# Patient Record
Sex: FEMALE | Race: WHITE | NOT HISPANIC OR LATINO | Employment: PART TIME | URBAN - METROPOLITAN AREA
[De-identification: names, ages, dates, MRNs, and addresses within clinical notes are randomized per-mention and may not be internally consistent; named-entity substitution may affect disease eponyms.]

---

## 2018-07-23 ENCOUNTER — TELEPHONE (OUTPATIENT)
Dept: OBSTETRICS AND GYNECOLOGY | Age: 43
End: 2018-07-23

## 2018-07-23 NOTE — TELEPHONE ENCOUNTER
Just discovered pt has not been seen in this office yet, has new pt appt 9-21-18.Scheduled 7-25-18 at 11:00 as new pt and pelvic US

## 2018-07-23 NOTE — TELEPHONE ENCOUNTER
Dr Collier pt who is aware is on call, had low back pain and lower Left quad pain plus nausea for 7 days, pt believed it to be kidney stones and had CT. Reviewed results today with urologist who states she had no kidney stones but it did show lesions on both ovaries david the Left ovary and to fu with gyn. Would you like to set up next avail US?

## 2018-07-25 ENCOUNTER — OFFICE VISIT (OUTPATIENT)
Dept: OBSTETRICS AND GYNECOLOGY | Age: 43
End: 2018-07-25

## 2018-07-25 ENCOUNTER — PROCEDURE VISIT (OUTPATIENT)
Dept: OBSTETRICS AND GYNECOLOGY | Age: 43
End: 2018-07-25

## 2018-07-25 VITALS
DIASTOLIC BLOOD PRESSURE: 82 MMHG | WEIGHT: 209.2 LBS | BODY MASS INDEX: 37.07 KG/M2 | SYSTOLIC BLOOD PRESSURE: 118 MMHG | HEIGHT: 63 IN

## 2018-07-25 DIAGNOSIS — N83.202 CYSTS OF BOTH OVARIES: Primary | ICD-10-CM

## 2018-07-25 DIAGNOSIS — N83.201 CYSTS OF BOTH OVARIES: Primary | ICD-10-CM

## 2018-07-25 DIAGNOSIS — N83.202 BILATERAL OVARIAN CYSTS: ICD-10-CM

## 2018-07-25 DIAGNOSIS — N92.1 MENORRHAGIA WITH IRREGULAR CYCLE: Primary | ICD-10-CM

## 2018-07-25 DIAGNOSIS — N92.3 INTERMENSTRUAL BLEEDING: ICD-10-CM

## 2018-07-25 DIAGNOSIS — N83.201 BILATERAL OVARIAN CYSTS: ICD-10-CM

## 2018-07-25 DIAGNOSIS — R10.2 PELVIC PAIN: ICD-10-CM

## 2018-07-25 LAB
B-HCG UR QL: NEGATIVE
INTERNAL NEGATIVE CONTROL: NEGATIVE
INTERNAL POSITIVE CONTROL: POSITIVE
Lab: NORMAL

## 2018-07-25 PROCEDURE — 76830 TRANSVAGINAL US NON-OB: CPT | Performed by: OBSTETRICS & GYNECOLOGY

## 2018-07-25 PROCEDURE — 58100 BIOPSY OF UTERUS LINING: CPT | Performed by: OBSTETRICS & GYNECOLOGY

## 2018-07-25 PROCEDURE — 99203 OFFICE O/P NEW LOW 30 MIN: CPT | Performed by: OBSTETRICS & GYNECOLOGY

## 2018-07-25 PROCEDURE — 81025 URINE PREGNANCY TEST: CPT | Performed by: OBSTETRICS & GYNECOLOGY

## 2018-07-25 RX ORDER — IBUPROFEN 800 MG/1
TABLET ORAL
COMMUNITY
Start: 2018-07-18 | End: 2018-11-26

## 2018-07-25 NOTE — PROGRESS NOTES
"Subjective     Chief Complaint   Patient presents with   • Follow-up     gyn us/ ovaries        Mary Cole is a 43 y.o. No obstetric history on file. whose LMP is Patient's last menstrual period was 06/27/2018 (exact date). presents for New Gyn visit (re-establish care). Last seen 9/2012. I delivered her fourth child. C/o 4 mo h/o lower abdominal pain that became worse last week. She had a CT scan done as she thought it could be a kidney stone, but it showed bilateral enlarged cystic ovaries. Her menses are q 4-6 mo and last 3-6 days. +menorrhagia and clots. She has some intermenstrual bleeding. Contraception-  vasectomy. She also c/o some throat tightening and some nausea. No family h/o gyn cancers. She has not had a MMG and her last pap was 2012.      No Additional Complaints Reported    The following portions of the patient's history were reviewed and updated as appropriate:vital signs, allergies, current medications, past family history, past medical history, past social history, past surgical history and problem list      Review of Systems   Constitutional: positive for fatigue  Eyes: negative  Ears, nose, mouth, throat, and face: throat tightening  Respiratory: negative  Cardiovascular: negative  Gastrointestinal: positive for abdominal pain and nausea  Genitourinary:positive for abnormal menstrual periods  Integument/breast: negative  Hematologic/lymphatic: negative  Musculoskeletal:negative  Neurological: negative  Behavioral/Psych: negative  Endocrine: negative     Objective      /82   Ht 160 cm (63\")   Wt 94.9 kg (209 lb 3.2 oz)   LMP 06/27/2018 (Exact Date)   BMI 37.06 kg/m²     Physical Exam    General:   alert, appears stated age and no distress   Heart: Not performed today   Lungs: Not performed today.   Breast: Not performed today   Neck: thyroid not enlarged, symmetric, no tenderness/mass/nodules   Abdomen: {soft, non-tender. Bowel sounds normal. No masses,  no organomegaly "   CVA: Not performed today   Pelvis: External genitalia: normal general appearance  Urinary system: urethral meatus normal  Vaginal: normal mucosa without prolapse or lesions  Cervix: normal appearance  Adnexa: tenderness, enlarged adnexa, left and right and mass, left and right (about 7 cm, very firm, mobile, +tenderness)  Uterus: normal single, nontender, anteverted and tender   Extremities: Extremities normal, atraumatic, no cyanosis or edema   Neurologic: negative   Psychiatric: Normal affect, judgement, and mood       Lab Review   Labs: UPT negative today    Imaging   Ultrasound - Pelvic Vaginal  Uterus 9.6 x 6 x 5.4 cm, EML=1.16 cm, left ovary 8 x 5 x 4.6 cm w/ multiple cysts, right ovary 7.2 x 5 x 5 cm with multiple cysts, no free fluid    Assessment/Plan     ASSESSMENT  1. Menorrhagia with irregular cycle    2. Intermenstrual bleeding    3. Bilateral ovarian cysts        PLAN  1.   Orders Placed This Encounter   Procedures   • TSH   •    • Comprehensive Metabolic Panel   • CBC & Differential       2. Medications prescribed this encounter:      No orders of the defined types were placed in this encounter.      3. Procedure- verbal consent obtained for endometrial biopsy. Betadine prep of cervix performed. Endometrial biopsy performed w/ pipelle w/ one pass. Tissue to pathology. Pt tolerated well. Recommend OTC ibuprofen for cramping.  4. Very concerned by this clinical picture with symptoms for 4 mo and hard masses felt on US. Will refer to gyn/oncology for evaluation. May require removal of ovaries. May consider ovarian suppression w/ low dose ocp if endometrial biopsy negative. Samples given of Lo loestrin.   Follow up: 1 week for annual (pap) and mammogram. Pt fearful of Mammogram but agrees to have it done, will given percocet 5/325 mg x2 tablets for pre-treatment    Mago Colunga MD  7/25/2018

## 2018-07-26 LAB
ALBUMIN SERPL-MCNC: 4.8 G/DL (ref 3.5–5.2)
ALBUMIN/GLOB SERPL: 1.9 G/DL
ALP SERPL-CCNC: 71 U/L (ref 39–117)
ALT SERPL-CCNC: 24 U/L (ref 1–33)
AST SERPL-CCNC: 17 U/L (ref 1–32)
BASOPHILS # BLD AUTO: 0.05 10*3/MM3 (ref 0–0.2)
BASOPHILS NFR BLD AUTO: 0.8 % (ref 0–1.5)
BILIRUB SERPL-MCNC: 0.2 MG/DL (ref 0.1–1.2)
BUN SERPL-MCNC: 9 MG/DL (ref 6–20)
BUN/CREAT SERPL: 9.6 (ref 7–25)
CALCIUM SERPL-MCNC: 9.2 MG/DL (ref 8.6–10.5)
CANCER AG125 SERPL-ACNC: 33.7 U/ML (ref 0–38.1)
CHLORIDE SERPL-SCNC: 104 MMOL/L (ref 98–107)
CO2 SERPL-SCNC: 22 MMOL/L (ref 22–29)
CREAT SERPL-MCNC: 0.94 MG/DL (ref 0.57–1)
DIFFERENTIAL COMMENT: NORMAL
EOSINOPHIL # BLD AUTO: 0.2 10*3/MM3 (ref 0–0.7)
EOSINOPHIL NFR BLD AUTO: 3.1 % (ref 0.3–6.2)
ERYTHROCYTE [DISTWIDTH] IN BLOOD BY AUTOMATED COUNT: 17.8 % (ref 11.7–13)
GLOBULIN SER CALC-MCNC: 2.5 GM/DL
GLUCOSE SERPL-MCNC: 96 MG/DL (ref 65–99)
HCT VFR BLD AUTO: 30.4 % (ref 35.6–45.5)
HGB BLD-MCNC: 8.8 G/DL (ref 11.9–15.5)
IMM GRANULOCYTES # BLD: 0.01 10*3/MM3 (ref 0–0.03)
IMM GRANULOCYTES NFR BLD: 0.2 % (ref 0–0.5)
LYMPHOCYTES # BLD AUTO: 2.12 10*3/MM3 (ref 0.9–4.8)
LYMPHOCYTES NFR BLD AUTO: 33 % (ref 19.6–45.3)
MCH RBC QN AUTO: 21.2 PG (ref 26.9–32)
MCHC RBC AUTO-ENTMCNC: 28.9 G/DL (ref 32.4–36.3)
MCV RBC AUTO: 73.3 FL (ref 80.5–98.2)
MONOCYTES # BLD AUTO: 0.45 10*3/MM3 (ref 0.2–1.2)
MONOCYTES NFR BLD AUTO: 7 % (ref 5–12)
NEUTROPHILS # BLD AUTO: 3.6 10*3/MM3 (ref 1.9–8.1)
NEUTROPHILS NFR BLD AUTO: 56.1 % (ref 42.7–76)
PLATELET # BLD AUTO: 313 10*3/MM3 (ref 140–500)
PLATELET BLD QL SMEAR: NORMAL
POTASSIUM SERPL-SCNC: 4.4 MMOL/L (ref 3.5–5.2)
PROT SERPL-MCNC: 7.3 G/DL (ref 6–8.5)
RBC # BLD AUTO: 4.15 10*6/MM3 (ref 3.9–5.2)
RBC MORPH BLD: NORMAL
SODIUM SERPL-SCNC: 140 MMOL/L (ref 136–145)
TSH SERPL DL<=0.005 MIU/L-ACNC: 2.64 MIU/ML (ref 0.27–4.2)
WBC # BLD AUTO: 6.42 10*3/MM3 (ref 4.5–10.7)

## 2018-07-30 LAB
DX ICD CODE: NORMAL
DX ICD CODE: NORMAL
PATH REPORT.FINAL DX SPEC: NORMAL
PATH REPORT.GROSS SPEC: NORMAL
PATH REPORT.RELEVANT HX SPEC: NORMAL
PATH REPORT.SITE OF ORIGIN SPEC: NORMAL
PATHOLOGIST NAME: NORMAL
PAYMENT PROCEDURE: NORMAL

## 2018-08-01 ENCOUNTER — OFFICE VISIT (OUTPATIENT)
Dept: OBSTETRICS AND GYNECOLOGY | Age: 43
End: 2018-08-01

## 2018-08-01 ENCOUNTER — APPOINTMENT (OUTPATIENT)
Dept: WOMENS IMAGING | Facility: HOSPITAL | Age: 43
End: 2018-08-01

## 2018-08-01 VITALS
SYSTOLIC BLOOD PRESSURE: 110 MMHG | HEIGHT: 63 IN | BODY MASS INDEX: 36.68 KG/M2 | WEIGHT: 207 LBS | DIASTOLIC BLOOD PRESSURE: 74 MMHG

## 2018-08-01 DIAGNOSIS — N92.1 MENORRHAGIA WITH IRREGULAR CYCLE: ICD-10-CM

## 2018-08-01 DIAGNOSIS — Z01.419 WELL WOMAN EXAM WITH ROUTINE GYNECOLOGICAL EXAM: Primary | ICD-10-CM

## 2018-08-01 DIAGNOSIS — R09.89 THROAT TIGHTNESS: ICD-10-CM

## 2018-08-01 DIAGNOSIS — D64.9 ANEMIA, UNSPECIFIED TYPE: ICD-10-CM

## 2018-08-01 DIAGNOSIS — Z11.51 ENCOUNTER FOR SCREENING FOR HUMAN PAPILLOMAVIRUS (HPV): ICD-10-CM

## 2018-08-01 PROCEDURE — 99396 PREV VISIT EST AGE 40-64: CPT | Performed by: OBSTETRICS & GYNECOLOGY

## 2018-08-01 PROCEDURE — 77067 SCR MAMMO BI INCL CAD: CPT | Performed by: RADIOLOGY

## 2018-08-01 RX ORDER — IBUPROFEN 800 MG/1
400 TABLET ORAL
COMMUNITY
Start: 2018-07-18 | End: 2018-11-26

## 2018-08-01 NOTE — PROGRESS NOTES
Chief complaint: annual    Subjective   History of Present Illness    Mary Cole is a 43 y.o.  who presents for annual exam. She was recently seen here for a problem visit as a re-establish care, New Gyn. Had c/o lower abdominal pain and CT scan showed bilateral enlarged cystic ovaries. US last week here noted bilateral enlarged cystic ovaries, one ovary was 8 cm in diameter and the other was 7 cm in diameter. On exam the ovaries felt very hard. She was sent to Gyn/Oncology for a consult and was offered definitive surgical evaluation w/ hysterectomy and BSO versus observation. She would like to observe at this time. A GI consult was made and is scheduled. Pt continues to c/o nausea, fatigue and throat tightness. She was started on Lo loestrin for menorrhagia w/ irregular menses and anemia (HGB=8.8) and for ovarian suppression.   Her menses are q 4-6 mo and very heavy.   EMB-benign, TSH normal, HGB=8.8     Obstetric History:  OB History      Para Term  AB Living    4 4 4     4    SAB TAB Ectopic Molar Multiple Live Births              4         Menstrual History:     Patient's last menstrual period was 2018 (exact date).         Current contraception: vasectomy  History of abnormal Pap smear: no  Received Gardasil immunization: no  Perform regular self breast exam: yes -    Family history of uterine or ovarian cancer: no  Family History of colon cancer: no  Family history of breast cancer: no    Mammogram: done today.  Colonoscopy: recommended.  DEXA: not indicated.    Exercise: moderately active  Calcium/Vitamin D: adequate intake    The following portions of the patient's history were reviewed and updated as appropriate: allergies, current medications, past family history, past medical history, past social history, past surgical history and problem list.    Review of Systems   Constitutional: Positive for fatigue.   HENT: Positive for trouble swallowing.         Throat tightness  "  Gastrointestinal: Positive for nausea.   Genitourinary: Positive for menstrual problem and pelvic pain.       Pertinent items are noted in HPI.     Objective   Physical Exam    /74   Ht 160 cm (63\")   Wt 93.9 kg (207 lb)   LMP 07/25/2018 (Exact Date)   Breastfeeding? No   BMI 36.67 kg/m²     General:   alert, appears stated age and cooperative   Neck: no asymmetry, masses, or scars   Heart: regular rate and rhythm, S1, S2 normal, no murmur, click, rub or gallop   Lungs: clear to auscultation bilaterally   Abdomen: soft, non-tender, without masses or organomegaly   Breast: inspection negative, no nipple discharge or bleeding, no masses or nodularity palpable   Vulva: normal, Bartholin's, Urethra, Pearson's normal   Vagina: normal mucosa   Cervix: multiparous appearance, no bleeding following Pap and no cervical motion tenderness   Uterus: normal size, anteverted, mobile, non-tender, normal shape and consistency   Adnexa: bilateral enlarged adnexal masses, very hard/firm, mobile   Rectal: not indicated     Assessment/Plan   Mary was seen today for gynecologic exam.    Diagnoses and all orders for this visit:    Well woman exam with routine gynecological exam  -     PapIG, HPV, Rfx 16 / 18    Encounter for screening for human papillomavirus (HPV)  -     PapIG, HPV, Rfx 16 / 18    Throat tightness  -     Ambulatory Referral to ENT (Otolaryngology)    Anemia, unspecified type    Menorrhagia with irregular cycle  -     Norethin-Eth Estrad-Fe Biphas (LO LOESTRIN FE) 1 MG-10 MCG / 10 MCG tablet; Take 1 tablet by mouth Daily.    Pt asked my opinion regarding surgery now versus observation, due to the size of the ovaries and how hard they felt, I would recommend surgical treatment.    Breast self exam technique reviewed and patient encouraged to perform self-exam monthly.  Discussed healthy lifestyle modifications.  Pap smear sent    Pt taking prenatal vitamin and additional iron for anemia.             "

## 2018-08-07 LAB
CYTOLOGIST CVX/VAG CYTO: ABNORMAL
CYTOLOGY CVX/VAG DOC THIN PREP: ABNORMAL
DX ICD CODE: ABNORMAL
DX ICD CODE: ABNORMAL
HIV 1 & 2 AB SER-IMP: ABNORMAL
HPV I/H RISK 1 DNA CVX QL PROBE+SIG AMP: NEGATIVE
Lab: ABNORMAL
OTHER STN SPEC: ABNORMAL
PATH REPORT.FINAL DX SPEC: ABNORMAL
PATHOLOGIST CVX/VAG CYTO: ABNORMAL
RECOM F/U CVX/VAG CYTO: ABNORMAL
STAT OF ADQ CVX/VAG CYTO-IMP: ABNORMAL

## 2018-11-26 ENCOUNTER — OFFICE VISIT (OUTPATIENT)
Dept: OBSTETRICS AND GYNECOLOGY | Age: 43
End: 2018-11-26

## 2018-11-26 VITALS
HEIGHT: 63 IN | DIASTOLIC BLOOD PRESSURE: 72 MMHG | WEIGHT: 210.6 LBS | SYSTOLIC BLOOD PRESSURE: 126 MMHG | BODY MASS INDEX: 37.32 KG/M2

## 2018-11-26 DIAGNOSIS — E89.41 HOT FLASHES DUE TO SURGICAL MENOPAUSE: ICD-10-CM

## 2018-11-26 DIAGNOSIS — G89.18 POST-OP PAIN: Primary | ICD-10-CM

## 2018-11-26 PROCEDURE — 99213 OFFICE O/P EST LOW 20 MIN: CPT | Performed by: OBSTETRICS & GYNECOLOGY

## 2019-01-22 ENCOUNTER — TELEPHONE (OUTPATIENT)
Dept: OBSTETRICS AND GYNECOLOGY | Age: 44
End: 2019-01-22

## 2019-09-25 ENCOUNTER — OFFICE VISIT (OUTPATIENT)
Dept: OBSTETRICS AND GYNECOLOGY | Age: 44
End: 2019-09-25

## 2019-09-25 VITALS
HEIGHT: 63 IN | BODY MASS INDEX: 37.92 KG/M2 | DIASTOLIC BLOOD PRESSURE: 72 MMHG | WEIGHT: 214 LBS | SYSTOLIC BLOOD PRESSURE: 114 MMHG

## 2019-09-25 DIAGNOSIS — R20.2 FACIAL PARESTHESIA: ICD-10-CM

## 2019-09-25 DIAGNOSIS — R10.11 RUQ PAIN: ICD-10-CM

## 2019-09-25 DIAGNOSIS — R51.9 NEW ONSET OF HEADACHES: ICD-10-CM

## 2019-09-25 DIAGNOSIS — R53.83 FATIGUE, UNSPECIFIED TYPE: ICD-10-CM

## 2019-09-25 DIAGNOSIS — E89.41 HOT FLASHES DUE TO SURGICAL MENOPAUSE: ICD-10-CM

## 2019-09-25 DIAGNOSIS — R10.2 PELVIC PAIN IN FEMALE: ICD-10-CM

## 2019-09-25 DIAGNOSIS — Z01.419 WELL WOMAN EXAM WITH ROUTINE GYNECOLOGICAL EXAM: Primary | ICD-10-CM

## 2019-09-25 DIAGNOSIS — K80.20 GALLSTONES: ICD-10-CM

## 2019-09-25 PROCEDURE — 99396 PREV VISIT EST AGE 40-64: CPT | Performed by: OBSTETRICS & GYNECOLOGY

## 2019-09-25 PROCEDURE — 99213 OFFICE O/P EST LOW 20 MIN: CPT | Performed by: OBSTETRICS & GYNECOLOGY

## 2019-09-25 RX ORDER — ESTRADIOL 2 MG/1
2 TABLET ORAL DAILY
Qty: 90 TABLET | Refills: 3 | Status: SHIPPED | OUTPATIENT
Start: 2019-09-25 | End: 2019-10-24 | Stop reason: SINTOL

## 2019-09-25 RX ORDER — ESTRADIOL 0.1 MG/D
1 FILM, EXTENDED RELEASE TRANSDERMAL 2 TIMES WEEKLY
Status: CANCELLED | OUTPATIENT
Start: 2019-09-26

## 2019-09-25 NOTE — PROGRESS NOTES
Chief complaint: annual    Subjective   History of Present Illness    Mary Cole is a 44 y.o.  who presents for annual exam.  Her menses are absent, prior divinci hyst w/ BSO per gyn/onc for bilateral ovarian masses (serous cystadenoma). Pt c/o severe hot flashes and night sweats. Was initially prescribed estrogel, but it was too expensive and she didn't use it (didn't call for other options). Currently tearful due to hot flashes, severe fatigue and weight gain. She c/o of some pelvic pain and back pain.  Also c/o RUQ pain and has h/o gallstones. She would like to see a surgeon to discuss cholecystectomy. She also c/o left facial paresthesia and new onset HA. She is very nervous about this due to her father being dx with a rare sinus tumor. She would like to see Neurology and have some imaging done. She needs a new primary MD.  Soc Hx- , teaches accounting part time, has 4 children (all doing well) and 1 grandchild    Obstetric History:  OB History      Para Term  AB Living    4 4 4     4    SAB TAB Ectopic Molar Multiple Live Births              4         Menstrual History:     Patient's last menstrual period was 2018 (exact date).         Current contraception: status post hysterectomy  History of abnormal Pap smear: no  Mammogram: ordered.  Colonoscopy: recommended.  DEXA: not indicated.    Exercise: moderately active  Calcium/Vitamin D: adequate intake    The following portions of the patient's history were reviewed and updated as appropriate: allergies, current medications, past family history, past medical history, past social history, past surgical history and problem list.    Review of Systems   Constitutional: Positive for fatigue and unexpected weight change. Negative for activity change and fever.   Respiratory: Negative for chest tightness and shortness of breath.    Cardiovascular: Negative for chest pain, palpitations and leg swelling.   Gastrointestinal:  "Positive for abdominal pain. Negative for abdominal distention, blood in stool, constipation, diarrhea, nausea and vomiting.   Endocrine: Positive for heat intolerance. Negative for cold intolerance, polydipsia, polyphagia and polyuria.   Genitourinary: Positive for pelvic pain.   Musculoskeletal: Negative for arthralgias and back pain.   Skin: Negative for color change.   Neurological: Positive for headaches. Negative for weakness.        Left facial paresthesia   Hematological: Does not bruise/bleed easily.   Psychiatric/Behavioral: Negative for confusion.       Pertinent items are noted in HPI.     Objective   Physical Exam    /72   Ht 160 cm (63\")   Wt 97.1 kg (214 lb)   LMP 07/25/2018 (Exact Date)   Breastfeeding? No   BMI 37.91 kg/m²     General:   alert, appears stated age and cooperative   Neck: no asymmetry, masses, or scars   Heart: regular rate and rhythm, S1, S2 normal, no murmur, click, rub or gallop   Lungs: clear to auscultation bilaterally   Abdomen: soft, non-tender, without masses or organomegaly   Breast: inspection negative, no nipple discharge or bleeding, no masses or nodularity palpable   Vulva: normal, Bartholin's, Urethra, [place]'s normal   Vagina: normal mucosa   Cervix: absent   Uterus: absent   Adnexa: normal adnexa and no mass, fullness, tenderness   Rectal: not indicated     Assessment/Plan   Mary was seen today for gynecologic exam.    Diagnoses and all orders for this visit:    Well woman exam with routine gynecological exam    Fatigue, unspecified type  -     Vitamin B12  -     Vitamin D 1,25 dihydroxy  -     TSH    Hot flashes due to surgical menopause  -     estradiol (ESTRACE) 2 MG tablet; Take 1 tablet by mouth Daily.    New onset of headaches  -     Ambulatory Referral to Neurology    Facial paresthesia  -     Ambulatory Referral to Neurology    Gallstones  -     Ambulatory Referral to General Surgery    RUQ pain  -     Ambulatory Referral to General " Surgery    Pelvic pain in female    Other orders  -     Cancel: estradiol (MINIVELLE, VIVELLE-DOT) 0.1 MG/24HR patch; Place 1 patch on the skin as directed by provider 2 (Two) Times a Week.        Breast self exam technique reviewed and patient encouraged to perform self-exam monthly.  Discussed healthy lifestyle modifications.  Pap smear not needed    Plan gyn US to evaluate pelvic pain, r/o ovarian remnant syndrome, could be adhesions from surgery  Recommend colon cancer screening at age 45, recommend checking with insurance to see if colonoscopy or cologuard is covered.  Discussed risks/benefits of estrogen- patch/gel options not affordable per insurance plan, will rx oral estrogen. Discussed risks of DVT,PE, stroke, breast cancer  Given names of primary MD  F/u for gyn US

## 2019-09-26 LAB
1,25(OH)2D3 SERPL-MCNC: 35.7 PG/ML (ref 19.9–79.3)
TSH SERPL DL<=0.005 MIU/L-ACNC: 1.68 UIU/ML (ref 0.27–4.2)
VIT B12 SERPL-MCNC: 876 PG/ML (ref 211–946)

## 2019-10-24 ENCOUNTER — OFFICE VISIT (OUTPATIENT)
Dept: OBSTETRICS AND GYNECOLOGY | Age: 44
End: 2019-10-24

## 2019-10-24 ENCOUNTER — PROCEDURE VISIT (OUTPATIENT)
Dept: OBSTETRICS AND GYNECOLOGY | Age: 44
End: 2019-10-24

## 2019-10-24 VITALS
WEIGHT: 212.8 LBS | SYSTOLIC BLOOD PRESSURE: 122 MMHG | BODY MASS INDEX: 37.7 KG/M2 | HEIGHT: 63 IN | DIASTOLIC BLOOD PRESSURE: 72 MMHG

## 2019-10-24 DIAGNOSIS — R10.11 RUQ PAIN: ICD-10-CM

## 2019-10-24 DIAGNOSIS — E89.41 HOT FLASHES DUE TO SURGICAL MENOPAUSE: ICD-10-CM

## 2019-10-24 DIAGNOSIS — R10.2 PELVIC PAIN IN FEMALE: Primary | ICD-10-CM

## 2019-10-24 DIAGNOSIS — R10.2 PELVIC PAIN: Primary | ICD-10-CM

## 2019-10-24 PROCEDURE — 76830 TRANSVAGINAL US NON-OB: CPT | Performed by: OBSTETRICS & GYNECOLOGY

## 2019-10-24 PROCEDURE — 99213 OFFICE O/P EST LOW 20 MIN: CPT | Performed by: OBSTETRICS & GYNECOLOGY

## 2019-10-24 NOTE — PROGRESS NOTES
"Subjective     Chief Complaint   Patient presents with   • Gynecologic Exam     Gyn follow up, LOV 19, U/S today @ 1:30, Pap 18 ASCUS, HPV neg repeat 1 yr, Mg 18       Mary Cole is a 44 y.o.  whose LMP is Patient's last menstrual period was 2018 (exact date). presents for gyn US for lower abdominal and pelvic pain. H/o divinci hysterectomy with gyn/onc for benign ovarian masses. Pathology benign. Started estrogel for hot flashes and feels better. Desires a rx for that. Will dc the estradiol tablets as prefers the estrogen. Has consult scheduled with Neurology for HA and facial paresthesia. Also has general surgery evaluation for RUQ pain. Will also discuss a screening colonoscopy with him (Dr Sumit Rivera).      No Additional Complaints Reported    The following portions of the patient's history were reviewed and updated as appropriate:vital signs, allergies, current medications, past family history, past medical history, past social history, past surgical history and problem list      Review of Systems   Constitutional: positive for fatigue  Eyes: negative  Ears, nose, mouth, throat, and face: negative  Respiratory: negative  Cardiovascular: negative  Gastrointestinal: positive for abdominal pain  Genitourinary:positive for pelvic pain  Musculoskeletal:negative  Neurological: positive for headaches and paresthesia  Behavioral/Psych: negative     Objective      /72   Ht 160 cm (63\")   Wt 96.5 kg (212 lb 12.8 oz)   LMP 2018 (Exact Date)   BMI 37.70 kg/m²     Physical Exam    General:   alert, appears stated age and no distress   Heart:    Lungs:    Breast:    Neck:    Abdomen:    CVA:    Pelvis:    Extremities: Extremities normal, atraumatic, no cyanosis or edema   Neurologic: negative   Psychiatric: Normal affect, judgement, and mood       Lab Review   Labs: TSH, vitamin D and Vit B12 all wnl     Imaging   Ultrasound - Pelvic Vaginal  Uterus and ovaries absent, no " adnexal masses  Assessment/Plan     ASSESSMENT  1. Pelvic pain in female    2. RUQ pain    3. Hot flashes due to surgical menopause    no evidence of ovarian remnant syndrome on US    PLAN  1. F/u as planned with general surgery for RUQ pain evaluation    2. Medications prescribed this encounter:        New Medications Ordered This Visit   Medications   • Estradiol (ESTROGEL) 0.75 MG/1.25 GM (0.06%) topical gel     Sig: Place 1.25 g on the skin as directed by provider Daily.     Dispense:  50 g     Refill:  12     Risks reviewed with pt last visit regarding estrogen use for postmenopausal female      Follow up: annual and prn    Mago Colunga MD  10/25/2019

## 2019-11-01 ENCOUNTER — OFFICE VISIT (OUTPATIENT)
Dept: NEUROLOGY | Facility: CLINIC | Age: 44
End: 2019-11-01

## 2019-11-01 ENCOUNTER — APPOINTMENT (OUTPATIENT)
Dept: LAB | Facility: HOSPITAL | Age: 44
End: 2019-11-01

## 2019-11-01 VITALS
WEIGHT: 210 LBS | DIASTOLIC BLOOD PRESSURE: 78 MMHG | HEART RATE: 80 BPM | HEIGHT: 63 IN | OXYGEN SATURATION: 97 % | SYSTOLIC BLOOD PRESSURE: 128 MMHG | BODY MASS INDEX: 37.21 KG/M2

## 2019-11-01 DIAGNOSIS — R41.3 MEMORY LOSS: ICD-10-CM

## 2019-11-01 DIAGNOSIS — R20.2 FACIAL PARESTHESIA: Primary | ICD-10-CM

## 2019-11-01 DIAGNOSIS — R51.9 PRESSURE IN HEAD: ICD-10-CM

## 2019-11-01 LAB — VIT B12 BLD-MCNC: 667 PG/ML (ref 211–946)

## 2019-11-01 PROCEDURE — 82607 VITAMIN B-12: CPT | Performed by: PSYCHIATRY & NEUROLOGY

## 2019-11-01 PROCEDURE — 99244 OFF/OP CNSLTJ NEW/EST MOD 40: CPT | Performed by: PSYCHIATRY & NEUROLOGY

## 2019-11-01 PROCEDURE — 36415 COLL VENOUS BLD VENIPUNCTURE: CPT | Performed by: PSYCHIATRY & NEUROLOGY

## 2019-11-01 NOTE — PROGRESS NOTES
Chief Complaint   Patient presents with   • Headache   • Numbness       Patient ID: Mary Cole is a 44 y.o. female.    HPI: Thank you for referring your patient to see us in the neurology clinic for evaluation today.  As you may know she is a 44-year-old female with a history of abnormal sensation in the left temporoparietal head region for 1 month.  She says that she feels a heat-like sensation in the left side of her head.  She points to the temporal parietal region.  She says that it is a slight pressure however more so a heat like warm sensation.  It is not hot to touch however does feel so in her head.  Again this is been going on for 1 month.  She will experience this sensation every 3 to 4 days or so.  She has also noted some tingling-like sensations of her left cheek.  This is been going on for the past several months now.  She has noted a slight change in her hearing.  She says that she has decreased hearing in the left ear.  She notes that has been an issue for the past year.  She also states that she feels a lymph node type enlargement behind her left ear.  She has noticed that her memory has declined.  She has a lot of issues with recall in the short-term.  No issues with long-term memory.  She has trouble with attention and focus.  She acknowledges that her dad was recently diagnosed with an oral sinus cancer.  She is concerned that she may have something related to that.  She denies any focal weakness or numbness of her arms or legs.  No vision changes.  No slurred speech or trouble swallowing.  No recent head injuries.  She denies any history of migraines.  She states that this is not what she would consider a headache issue.  She does not feel like she has a headache it is more of an abnormal sensation of the head.    The following portions of the patient's history were reviewed and updated as appropriate: allergies, current medications, past family history, past medical history, past social  history, past surgical history and problem list.    Review of Systems   Constitutional: Positive for fatigue. Negative for activity change and appetite change.   HENT: Positive for ear pain (left ear ), sinus pressure (left side ) and trouble swallowing.    Eyes: Negative for photophobia, redness and visual disturbance.   Respiratory: Negative for chest tightness, shortness of breath and wheezing.    Cardiovascular: Negative for chest pain, palpitations and leg swelling.   Gastrointestinal: Negative for abdominal pain, nausea and vomiting.   Endocrine: Positive for polydipsia. Negative for cold intolerance and heat intolerance.   Musculoskeletal: Negative for back pain, gait problem and neck pain.   Skin: Negative for color change, rash and wound.   Allergic/Immunologic: Negative for environmental allergies, food allergies and immunocompromised state.   Neurological: Positive for light-headedness, numbness (all front left side of face. tingling feeling ) and headaches (pain increasing on left side. hot feeling on head. ). Negative for dizziness, tremors, seizures, syncope, facial asymmetry, speech difficulty and weakness.   Hematological: Positive for adenopathy. Does not bruise/bleed easily.   Psychiatric/Behavioral: Positive for decreased concentration. Negative for agitation, behavioral problems, confusion, dysphoric mood, hallucinations, self-injury, sleep disturbance and suicidal ideas. The patient is not nervous/anxious and is not hyperactive.       I reviewed and agree with the above review of systems completed by the medical assistant.    Vitals:    11/01/19 0839   BP: 128/78   Pulse: 80   SpO2: 97%       Neurologic Exam     Mental Status   Oriented to person, place, and time.   Registration: recalls 3 of 3 objects. Follows 3 step commands.   Attention: normal. Concentration: normal.   Speech: speech is normal   Level of consciousness: alert  Knowledge: consistent with education (No deficits found.).    Normal comprehension.     Cranial Nerves     CN II   Visual fields full to confrontation.     CN III, IV, VI   Pupils are equal, round, and reactive to light.  Extraocular motions are normal.   CN III: no CN III palsy  CN VI: no CN VI palsy  Nystagmus: none   Diplopia: none    CN V   Facial sensation intact.     CN VII   Facial expression full, symmetric.     CN VIII   CN VIII normal.     CN IX, X   CN IX normal.   CN X normal.     CN XI   CN XI normal.     CN XII   CN XII normal.     Motor Exam   Muscle bulk: normal  Right arm tone: normal  Left arm tone: normal  Right leg tone: normal  Left leg tone: normal    Strength   Right neck flexion: 5/5  Left neck flexion: 5/5  Right neck extension: 5/5  Left neck extension: 5/5  Right deltoid: 5/5  Left deltoid: 5/5  Right biceps: 5/5  Left biceps: 5/5  Right triceps: 5/5  Left triceps: 5/5  Right wrist flexion: 5/5  Left wrist flexion: 5/5  Right wrist extension: 5/5  Left wrist extension: 5/5  Right interossei: 5/5  Left interossei: 5/5  Right abdominals: 5/5  Left abdominals: 5/5  Right iliopsoas: 5/5  Left iliopsoas: 5/5  Right quadriceps: 5/5  Left quadriceps: 5/5  Right hamstrin/5  Left hamstrin/5  Right glutei: 5/5  Left glutei: 5/5  Right anterior tibial: 5/5  Left anterior tibial: 5/5  Right posterior tibial: 5/5  Left posterior tibial: 5/5  Right peroneal: 5/5  Left peroneal: 5/5  Right gastroc: 5/5  Left gastroc: 5/5    Sensory Exam   Light touch normal.   Vibration normal.   Proprioception normal.   Pinprick normal.     Gait, Coordination, and Reflexes     Gait  Gait: normal    Coordination   Romberg: negative    Tremor   Resting tremor: absent  Intention tremor: absent    Reflexes   Right brachioradialis: 2+  Left brachioradialis: 2+  Right biceps: 2+  Left biceps: 2+  Right triceps: 2+  Left triceps: 2+  Right patellar: 2+  Left patellar: 2+  Right achilles: 2+  Left achilles: 2+  Right : 2+  Left : 2+Station is normal.       Physical Exam    Constitutional: She is oriented to person, place, and time. She appears well-developed. No distress.   HENT:   Head: Normocephalic and atraumatic.   Eyes: EOM are normal. Pupils are equal, round, and reactive to light.   Neck: Normal range of motion.   Cardiovascular: Normal rate, regular rhythm and normal heart sounds.   Pulmonary/Chest: Effort normal and breath sounds normal. No respiratory distress.   Abdominal: Soft. Bowel sounds are normal. She exhibits no distension. There is no tenderness.   Musculoskeletal: She exhibits no edema or deformity.   Neurological: She is oriented to person, place, and time. She has a normal Romberg Test. Gait normal.   Reflex Scores:       Tricep reflexes are 2+ on the right side and 2+ on the left side.       Bicep reflexes are 2+ on the right side and 2+ on the left side.       Brachioradialis reflexes are 2+ on the right side and 2+ on the left side.       Patellar reflexes are 2+ on the right side and 2+ on the left side.       Achilles reflexes are 2+ on the right side and 2+ on the left side.  Skin: Skin is warm. No rash noted.   Psychiatric: She has a normal mood and affect. Her speech is normal. Judgment normal.   Vitals reviewed.      Procedures    Assessment/Plan: We will schedule her for an MRI of her brain with and without contrast.  As well I would like to check a vitamin B12 level.  We will see her back after testing.       Mary was seen today for headache and numbness.    Diagnoses and all orders for this visit:    Facial paresthesia  -     MRI Brain With & Without Contrast; Future  -     Vitamin B12    Pressure in head  -     MRI Brain With & Without Contrast; Future    Memory loss  -     Vitamin B12           Miguel Lake II, MD

## 2019-11-12 ENCOUNTER — OFFICE VISIT (OUTPATIENT)
Dept: SURGERY | Facility: CLINIC | Age: 44
End: 2019-11-12

## 2019-11-12 VITALS — HEIGHT: 63 IN | WEIGHT: 216.4 LBS | BODY MASS INDEX: 38.34 KG/M2

## 2019-11-12 DIAGNOSIS — R10.11 RIGHT UPPER QUADRANT ABDOMINAL PAIN: Primary | ICD-10-CM

## 2019-11-12 PROCEDURE — 99243 OFF/OP CNSLTJ NEW/EST LOW 30: CPT | Performed by: SURGERY

## 2019-11-12 NOTE — PROGRESS NOTES
Subjective   Mary Cole is a 44 y.o. female who presents to the office in surgical consultation from Mago Colunga MD for right upper quadrant abdominal pain.    History of Present Illness     The patient has a 1 month history of intermittent right upper quadrant abdominal pain.  The pain seems to occur randomly with no clear association with eating or activity.  She has noticed a decrease in her appetite that she believes may be related to the right upper quadrant abdominal pain.  There is been no associated nausea.  Her bowel function has been normal.  She has not had any other testing.    Review of Systems   Constitutional: Negative for activity change, appetite change, fatigue and fever.   HENT: Negative for trouble swallowing and voice change.    Respiratory: Negative for chest tightness and shortness of breath.    Cardiovascular: Negative for chest pain and palpitations.   Gastrointestinal: Positive for abdominal pain. Negative for blood in stool, constipation, diarrhea, nausea and vomiting.   Endocrine: Negative for cold intolerance and heat intolerance.   Genitourinary: Negative for dysuria and flank pain.   Neurological: Negative for dizziness and light-headedness.   Hematological: Negative for adenopathy. Does not bruise/bleed easily.   Psychiatric/Behavioral: Negative for agitation and confusion.     Past Medical History:   Diagnosis Date   • Kidney stone    • Ovarian cyst     bilat serous cystadenomas     Past Surgical History:   Procedure Laterality Date   • TOTAL ABDOMINAL HYSTERECTOMY WITH SALPINGO OOPHORECTOMY  10/2018    DiVinci Hyst w/ BSO,serouscystadenomas angel.     Family History   Problem Relation Age of Onset   • No Known Problems Mother    • Cancer Father         Sinus   • Lymphoma Maternal Grandmother         non hodgkins    • No Known Problems Maternal Grandfather    • No Known Problems Paternal Grandmother    • No Known Problems Paternal Grandfather    • No Known Problems Sister     • No Known Problems Brother    • No Known Problems Son    • No Known Problems Son    • No Known Problems Daughter    • No Known Problems Daughter    • Breast cancer Neg Hx    • Ovarian cancer Neg Hx    • Uterine cancer Neg Hx    • Colon cancer Neg Hx    • Deep vein thrombosis Neg Hx    • Pulmonary embolism Neg Hx      Social History     Socioeconomic History   • Marital status:      Spouse name: EMRE    • Number of children: Not on file   • Years of education: Not on file   • Highest education level: Not on file   Tobacco Use   • Smoking status: Never Smoker   • Smokeless tobacco: Never Used   Substance and Sexual Activity   • Alcohol use: Yes     Alcohol/week: 0.6 oz     Types: 1 Cans of beer per week     Comment: Occasional// caffeine 2 cups per day    • Drug use: No       Objective   Physical Exam   Constitutional: She is oriented to person, place, and time. She appears well-developed and well-nourished.  Non-toxic appearance.   HENT:   Head: Normocephalic and atraumatic.   Eyes: EOM are normal. No scleral icterus.   Neck: Normal range of motion. Neck supple.   Pulmonary/Chest: Effort normal. No respiratory distress.   Abdominal: Soft. Normal appearance. There is tenderness (Mildly tender to deep palpation) in the right upper quadrant.   Neurological: She is alert and oriented to person, place, and time.   Skin: Skin is warm and dry.   Psychiatric: She has a normal mood and affect. Her behavior is normal. Judgment and thought content normal.       Assessment/Plan       The encounter diagnosis was Right upper quadrant abdominal pain.    The patient has intermittent right upper quadrant abdominal pain and a slight decrease in her appetite.  She also has some mild tenderness to deep palpation of the right upper quadrant.  Her symptoms are suspicious for gallbladder disease.  A right upper quadrant ultrasound has been ordered for further evaluation.

## 2019-11-16 ENCOUNTER — HOSPITAL ENCOUNTER (OUTPATIENT)
Dept: MRI IMAGING | Facility: HOSPITAL | Age: 44
Discharge: HOME | End: 2019-11-16
Admitting: PSYCHIATRY & NEUROLOGY

## 2019-11-16 DIAGNOSIS — R20.2 FACIAL PARESTHESIA: ICD-10-CM

## 2019-11-16 DIAGNOSIS — R51.9 PRESSURE IN HEAD: ICD-10-CM

## 2019-11-16 PROCEDURE — 70551 MRI BRAIN STEM W/O DYE: CPT

## 2019-12-12 NOTE — PROGRESS NOTES
"Subjective     Mary Cole is a 43 y.o. female who presents to the clinic 6 weeks status post DiVinci total hysterectomy w/ bilateral salpingectomy with Gyn/Oncology. Was referred to Dr Dietrich for consult. Then patient self referred to Dr Langford for an earlier surgery date. She saw him for a 2 weeks post op check and then was discharged from his care. She states that the pathology was all benign. She thinks the ovarian pathology noted bilateral serouscystadenomas. There was a benign endometrial polyp also. She c/o LLQ tenderness for the last 9 days that started with her twisting her abdomen. This has not improved over time. She was given a release from work for 6 weeks, but she feels that she needs at least 8 weeks due to this abdominal pain. She is off all pain medication. She is having hot flashes and mood swings. She would like to try estrogen for the hot flashes. She is trying some OTC remedies, but they are not working well. She is eating a regular diet but her appetite is still decreased. Bowel movements are normal. She is not having any vaginal bleeding. No fevers/chills.    The following portions of the patient's history were reviewed and updated as appropriate: allergies, current medications, past family history, past medical history, past social history, past surgical history and problem list.    Review of Systems  Constitutional: negative  Eyes: negative  Ears, nose, mouth, throat, and face: negative  Respiratory: negative  Cardiovascular: negative  Gastrointestinal: positive for LLQ pain and some midline pain  Genitourinary:negative  Integument/breast: negative  Hematologic/lymphatic: negative  Musculoskeletal:negative  Neurological: negative  Behavioral/Psych: negative  Endocrine: positive for hot flashes      Objective     /72   Ht 160 cm (63\")   Wt 95.5 kg (210 lb 9.6 oz)   LMP 07/25/2018 (Exact Date)   BMI 37.31 kg/m²   General:  alert, appears stated age and cooperative "   Abdomen: Soft, LLQ tenderness, no rebound, no masses, no hematomas or bruising   Incision:    Multiple small incisions healing well, no erythema, no induration     spec exam- vaginal cuff healing well  Bimanual deferred    Assessment      Doing well postoperatively except for recent LLQ pain  Hot flashes with surgical menopause   Plan     1. Work excuse given to extend leave to 8 weeks. But pt will need to see Gyn/Oncology to have FMLA paperwork re-done.  2. Spoke w/ Dr Langford's office to schedule f/u appt with him due to post-op complaints of LLQ pain. Earliest work in was Wednesday, 11/28/18.  3. Activity restrictions: continue restrictions  4. Anticipated return to work: work letter provided   5. Follow up: prn and annual  6. desires estrogen for hot flashes, recommend transdermal estrogen. rx estrogel 1 pump to forearm daily. Risks of estrogen discussed- VTE, stroke, breast cancer. Will use for a short amt of time. Can start weaning down in several years.   Mercedes Flap Text: The defect edges were debeveled with a #15 scalpel blade.  Given the location of the defect, shape of the defect and the proximity to free margins a Mercedes flap was deemed most appropriate.  Using a sterile surgical marker, an appropriate advancement flap was drawn incorporating the defect and placing the expected incisions within the relaxed skin tension lines where possible. The area thus outlined was incised deep to adipose tissue with a #15 scalpel blade.  The skin margins were undermined to an appropriate distance in all directions utilizing iris scissors.

## 2020-01-31 DIAGNOSIS — E89.41 HOT FLASHES DUE TO SURGICAL MENOPAUSE: Primary | ICD-10-CM

## 2020-01-31 RX ORDER — ESTRADIOL 0.05 MG/D
1 PATCH TRANSDERMAL WEEKLY
Qty: 4 PATCH | Refills: 11 | Status: SHIPPED | OUTPATIENT
Start: 2020-01-31 | End: 2020-10-01

## 2020-10-01 ENCOUNTER — OFFICE VISIT (OUTPATIENT)
Dept: OBSTETRICS AND GYNECOLOGY | Age: 45
End: 2020-10-01

## 2020-10-01 VITALS
WEIGHT: 215.2 LBS | HEIGHT: 63 IN | BODY MASS INDEX: 38.13 KG/M2 | SYSTOLIC BLOOD PRESSURE: 122 MMHG | DIASTOLIC BLOOD PRESSURE: 72 MMHG

## 2020-10-01 DIAGNOSIS — Z01.419 WELL WOMAN EXAM WITH ROUTINE GYNECOLOGICAL EXAM: Primary | ICD-10-CM

## 2020-10-01 PROBLEM — R10.11 RUQ PAIN: Status: RESOLVED | Noted: 2019-09-25 | Resolved: 2020-10-01

## 2020-10-01 PROBLEM — R10.2 PELVIC PAIN IN FEMALE: Status: RESOLVED | Noted: 2019-09-25 | Resolved: 2020-10-01

## 2020-10-01 PROCEDURE — 99396 PREV VISIT EST AGE 40-64: CPT | Performed by: OBSTETRICS & GYNECOLOGY

## 2020-10-01 NOTE — PROGRESS NOTES
"Chief complaint: annual    Subjective   History of Present Illness    Mary Cole is a 45 y.o.  who presents for annual exam.  Her menses are absent. Prior divinci hyst w/ oncology w/ BSO (bilateral serous cystadenomas)  Denies pelvic pain, takes OTC plant based estrogen with good relief of hot flashes. She dc'd climara (patch came off)  Had RUQ last year and saw general surgeon, pt states pain resolved, she did not f/u for GB scan  Seen by Neurology last year for facial numbness, MRI normal, symptoms resolved  Pt has not had a MMG since 2018  Declines colonoscopy this year  Soc hx- ,  (teaches at [employer])    Obstetric History:  OB History        4    Para   4    Term   4            AB        Living   4       SAB        TAB        Ectopic        Molar        Multiple        Live Births   4               Menstrual History:     Patient's last menstrual period was 2018 (exact date).         Current contraception: status post hysterectomy  History of abnormal Pap smear: no  Received Gardasil immunization: no  Perform regular self breast exam: yes -    Family history of uterine or ovarian cancer: no  Family History of colon cancer: no  Family history of breast cancer: no    Mammogram: ordered.  Colonoscopy: recommended.  DEXA: not indicated.    Exercise: moderately active  Calcium/Vitamin D: adequate intake    The following portions of the patient's history were reviewed and updated as appropriate: allergies, current medications, past family history, past medical history, past social history, past surgical history and problem list.    Review of Systems   Constitutional: Negative.    Gastrointestinal: Negative.    Endocrine: Negative.    Genitourinary: Negative.    Neurological: Negative.        Pertinent items are noted in HPI.     Objective   Physical Exam    /72   Ht 160 cm (63\")   Wt 97.6 kg (215 lb 3.2 oz)   LMP 2018 (Exact Date)   " Breastfeeding No   BMI 38.12 kg/m²     General:   alert, appears stated age and cooperative   Neck: no asymmetry, masses, or scars   Heart: regular rate and rhythm, S1, S2 normal, no murmur, click, rub or gallop   Lungs: clear to auscultation bilaterally   Abdomen: soft, non-tender, without masses or organomegaly   Breast: inspection negative, no nipple discharge or bleeding, no masses or nodularity palpable   Vulva: normal, Bartholin's, Urethra, Mascotte's normal   Vagina: normal mucosa   Cervix: absent   Uterus: absent   Adnexa: normal adnexa and no mass, fullness, tenderness   Rectal: not indicated     Assessment/Plan   Mary was seen today for gynecologic exam.    Diagnoses and all orders for this visit:    Well woman exam with routine gynecological exam        Breast self exam technique reviewed and patient encouraged to perform self-exam monthly.  Discussed healthy lifestyle modifications.  Pap smear not needed   Recommend yearly MMG. Especially important as she takes estrogen (even plant based estrogen has risks) pt states she will schedule for later this year    Encouraged pt to get a primary MD, needs cholesterol screening, names given    Recommend colonoscopy for colon cancer screen, pt declines this year, may consider next year

## 2021-04-06 ENCOUNTER — BULK ORDERING (OUTPATIENT)
Dept: CASE MANAGEMENT | Facility: OTHER | Age: 46
End: 2021-04-06

## 2021-04-06 DIAGNOSIS — Z23 IMMUNIZATION DUE: ICD-10-CM

## 2021-10-13 ENCOUNTER — PROCEDURE VISIT (OUTPATIENT)
Dept: OBSTETRICS AND GYNECOLOGY | Age: 46
End: 2021-10-13

## 2021-10-13 ENCOUNTER — OFFICE VISIT (OUTPATIENT)
Dept: OBSTETRICS AND GYNECOLOGY | Age: 46
End: 2021-10-13

## 2021-10-13 ENCOUNTER — APPOINTMENT (OUTPATIENT)
Dept: WOMENS IMAGING | Facility: HOSPITAL | Age: 46
End: 2021-10-13

## 2021-10-13 VITALS
BODY MASS INDEX: 38.38 KG/M2 | WEIGHT: 216.6 LBS | SYSTOLIC BLOOD PRESSURE: 124 MMHG | DIASTOLIC BLOOD PRESSURE: 72 MMHG | HEIGHT: 63 IN

## 2021-10-13 DIAGNOSIS — Z12.31 VISIT FOR SCREENING MAMMOGRAM: Primary | ICD-10-CM

## 2021-10-13 DIAGNOSIS — Z78.9 EDUCATED ABOUT MANAGEMENT OF WEIGHT: ICD-10-CM

## 2021-10-13 DIAGNOSIS — Z01.419 WELL WOMAN EXAM WITH ROUTINE GYNECOLOGICAL EXAM: Primary | ICD-10-CM

## 2021-10-13 PROCEDURE — 99396 PREV VISIT EST AGE 40-64: CPT | Performed by: STUDENT IN AN ORGANIZED HEALTH CARE EDUCATION/TRAINING PROGRAM

## 2021-10-13 PROCEDURE — 77067 SCR MAMMO BI INCL CAD: CPT | Performed by: RADIOLOGY

## 2021-10-13 PROCEDURE — 77067 SCR MAMMO BI INCL CAD: CPT | Performed by: STUDENT IN AN ORGANIZED HEALTH CARE EDUCATION/TRAINING PROGRAM

## 2021-10-13 PROCEDURE — 77063 BREAST TOMOSYNTHESIS BI: CPT | Performed by: STUDENT IN AN ORGANIZED HEALTH CARE EDUCATION/TRAINING PROGRAM

## 2021-10-13 PROCEDURE — 77063 BREAST TOMOSYNTHESIS BI: CPT | Performed by: RADIOLOGY

## 2021-10-13 NOTE — PROGRESS NOTES
Obstetrics and Gynecology   Routine Annual Visit    10/13/2021    Patient: Mary Cole          MR#:[id number]    History of Present Illness    Chief Complaint   Patient presents with   • Gynecologic Exam     AE and Mg today, Last AE 10/01/2020, Last pap 18 ASCUS HPV neg, Hysterectomy 10/2018, No problems       46 y.o. female  s/p TRH-BSO who presents for annual exam.  She is doing well with no complaints.  Denies any bleeding.  Was taking over-the-counter estrogen supplement for hot flashes but stopped it due to increased risk of breast cancer.  Now just taking black cohosh.  Hot flashes are dwindling now and she would not like to pursue any other interventions.    Does not get any vaccines and has not gotten Covid or flu shot.    Studies reviewed:  Mammo Screening Bilateral With CAD (2018 13:08) - birads 1, repeat today  TSH (2019 13:56)  PapIG, HPV, Rfx 16 / 18 (2018 11:45) - ASCUS, HPV neg    Obstetric History:  OB History        4    Para   4    Term   4            AB        Living   4       SAB        IAB        Ectopic        Molar        Multiple        Live Births   4               Menstrual History:     Patient's last menstrual period was 2018 (exact date).       Sexual History:     Sexually active, declines STD screen    Social History:  Professor of accounting  ________________________________________  Patient Active Problem List   Diagnosis   (none) - all problems resolved or deleted     Past Medical History:   Diagnosis Date   • Kidney stone    • Ovarian cyst     bilat serous cystadenomas     Past Surgical History:   Procedure Laterality Date   • TOTAL ABDOMINAL HYSTERECTOMY WITH SALPINGO OOPHORECTOMY  10/2018    DiVinci Hyst w/ BSO,serouscystadenomas angel.     Social History     Tobacco Use   Smoking Status Never Smoker   Smokeless Tobacco Never Used     Family History   Problem Relation Age of Onset   • No Known  "Problems Mother    • Cancer Father         Sinus   • Lymphoma Maternal Grandmother         non hodgkins    • No Known Problems Maternal Grandfather    • No Known Problems Paternal Grandmother    • No Known Problems Paternal Grandfather    • No Known Problems Sister    • No Known Problems Brother    • No Known Problems Son    • No Known Problems Son    • No Known Problems Daughter    • No Known Problems Daughter    • Breast cancer Neg Hx    • Ovarian cancer Neg Hx    • Uterine cancer Neg Hx    • Colon cancer Neg Hx    • Deep vein thrombosis Neg Hx    • Pulmonary embolism Neg Hx      Prior to Admission medications    Medication Sig Start Date End Date Taking? Authorizing Provider   BLACK COHOSH PO Take  by mouth Daily.   Yes Lakeisha Arboleda MD   Multiple Vitamin (MULTI-VITAMIN DAILY PO) Take  by mouth.   Yes Lakeisha Arboleda MD   Misc Natural Products (HOT FLASHEX PO) Take 3 tablets by mouth Daily.  10/13/21  Lakeisha Arboleda MD     ________________________________________    Current contraception: status post hysterectomy  History of abnormal Pap smear: no  Family history of uterine or ovarian cancer: no  Family History of colon cancer/colon polyps: no  History of abnormal mammogram: no  History of abnormal lipids: no    The following portions of the patient's history were reviewed and updated as appropriate: allergies, current medications, past family history, past medical history, past social history, past surgical history and problem list.    Review of Systems   All other systems reviewed and are negative.           Objective     /72   Ht 160 cm (63\")   Wt 98.2 kg (216 lb 9.6 oz)   LMP 07/25/2018 (Exact Date)   Breastfeeding No   BMI 38.37 kg/m²    BP Readings from Last 3 Encounters:   10/13/21 124/72   10/01/20 122/72   11/01/19 128/78      Wt Readings from Last 3 Encounters:   10/13/21 98.2 kg (216 lb 9.6 oz)   10/01/20 97.6 kg (215 lb 3.2 oz)   11/12/19 98.2 kg (216 lb 6.4 oz)    " "    BMI: Estimated body mass index is 38.37 kg/m² as calculated from the following:    Height as of this encounter: 160 cm (63\").    Weight as of this encounter: 98.2 kg (216 lb 9.6 oz).    Physical Exam  Vitals and nursing note reviewed. Exam conducted with a chaperone present.   Constitutional:       General: She is not in acute distress.     Appearance: Normal appearance.   HENT:      Head: Normocephalic and atraumatic.   Eyes:      Extraocular Movements: Extraocular movements intact.   Cardiovascular:      Rate and Rhythm: Normal rate and regular rhythm.      Pulses: Normal pulses.      Heart sounds: No murmur heard.      Pulmonary:      Effort: Pulmonary effort is normal. No respiratory distress.      Breath sounds: Normal breath sounds.   Chest:   Breasts:      Right: Normal. No mass, nipple discharge, skin change, tenderness or axillary adenopathy.      Left: Normal. No mass, nipple discharge, skin change, tenderness or axillary adenopathy.       Abdominal:      General: There is no distension.      Palpations: Abdomen is soft. There is no mass.      Tenderness: There is no abdominal tenderness.   Genitourinary:     General: Normal vulva.      Labia:         Right: No rash or lesion.         Left: No rash or lesion.       Urethra: No prolapse, urethral swelling or urethral lesion.      Vagina: Normal.      Comments: Bladder: no masses or tenderness  Perineum/Anus: no masses, lesions, or skin changes    Surgically absent uterus/cervix/ovaries  Musculoskeletal:         General: No swelling. Normal range of motion.      Cervical back: Normal range of motion.   Lymphadenopathy:      Upper Body:      Right upper body: No axillary adenopathy.      Left upper body: No axillary adenopathy.   Skin:     General: Skin is warm and dry.   Neurological:      General: No focal deficit present.      Mental Status: She is alert and oriented to person, place, and time.   Psychiatric:         Mood and Affect: Mood normal.       "   Behavior: Behavior normal.         As part of wellness and prevention, the following topics were discussed with the patient:  Encouraged self breast exam  Physical activity and regular exercised encouraged.   Injury prevention discussed.  Healthy weight discussed.  Nutrition discussed.  Sexual behavior/safe practices discussed.   Sexual transmitted disease prevention   Mental health discussed.   Vaccinations/immunizations addressed.         Assessment:  Diagnoses and all orders for this visit:    1. Well woman exam with routine gynecological exam (Primary)    -Breast and pelvic exam normal  -Declined STD screen  -No need for further Paps after hysterectomy without history of high-grade abnormality in last 25 yrs (h/o ASCUS but no higher)  -Mammogram today  -Discussed the value of vaccines and encouraged patient to obtain Covid and flu shot but she declined for Islam reasons    2. Educated about management of weight  -Patient feels she has gained 30+ pounds since hysterectomy in 2018, her weight has been stable for 3 years per chart review but no prior records available  -TSH within normal limits in 2019, no other symptoms to suggest hypothyroidism  -She is not currently exercising but lives an active lifestyle, walking across campus and swimming when she goes out on the boat.  We discussed initiating moderate intensity aerobic exercise like walking or cycling for 30 to 60 minutes 3-5 times per week.  -Also discussed tracking her diet to identify sources of excess calories and quantify caloric intake.  We discussed using an david to do this.  -She is not sure if she is ready to commit to diet and exercise yet but will think about it.      Plan:  Return in about 1 year (around 10/13/2022) for Annual physical.      Marilyn Alvarez MD  10/13/2021 14:17 EDT

## 2021-10-15 NOTE — PROGRESS NOTES
Please call patient to let her know her mammogram is normal and should be repeated in 1 year.  Thank you,  Marilyn Alvarez MD  10/15/21 11:44 EDT

## 2021-11-16 ENCOUNTER — OFFICE VISIT (OUTPATIENT)
Dept: FAMILY MEDICINE CLINIC | Facility: CLINIC | Age: 46
End: 2021-11-16

## 2021-11-16 VITALS
DIASTOLIC BLOOD PRESSURE: 76 MMHG | WEIGHT: 221.8 LBS | HEART RATE: 72 BPM | HEIGHT: 63 IN | OXYGEN SATURATION: 98 % | SYSTOLIC BLOOD PRESSURE: 114 MMHG | BODY MASS INDEX: 39.3 KG/M2 | RESPIRATION RATE: 17 BRPM | TEMPERATURE: 98.8 F

## 2021-11-16 DIAGNOSIS — M25.562 CHRONIC PAIN OF LEFT KNEE: Primary | ICD-10-CM

## 2021-11-16 DIAGNOSIS — E78.5 HYPERLIPIDEMIA, UNSPECIFIED HYPERLIPIDEMIA TYPE: ICD-10-CM

## 2021-11-16 DIAGNOSIS — G89.29 CHRONIC PAIN OF LEFT KNEE: Primary | ICD-10-CM

## 2021-11-16 DIAGNOSIS — E66.9 OBESITY (BMI 35.0-39.9 WITHOUT COMORBIDITY): ICD-10-CM

## 2021-11-16 PROBLEM — R19.00 PELVIC MASS: Status: ACTIVE | Noted: 2018-10-02

## 2021-11-16 PROBLEM — N83.202 BILATERAL OVARIAN CYSTS: Status: ACTIVE | Noted: 2018-07-26

## 2021-11-16 PROBLEM — N83.201 BILATERAL OVARIAN CYSTS: Status: ACTIVE | Noted: 2018-07-26

## 2021-11-16 PROBLEM — R10.10 UPPER ABDOMINAL PAIN: Status: ACTIVE | Noted: 2018-09-13

## 2021-11-16 PROCEDURE — 99203 OFFICE O/P NEW LOW 30 MIN: CPT | Performed by: FAMILY MEDICINE

## 2021-11-16 NOTE — PROGRESS NOTES
"Chief Complaint  Establish Care (NP to Jen), Knee Pain (left knee), and Labs Only (wants labs done )  Previously seen by gynecology. Pt is new to me, problems are new to me.    Subjective          Mary Cole presents to [facility]  History of Present Illness  She mainly needed a PCP. Her gynecologist Dr. Tyler referred her here.   She is concerned about her weight. This is worse after she had CAMERON.   Her left knee also with pain. Feels like   Mostly good ROM except sometimes  Started around May 2021.   She does not recall any specific injury. But she does have cat wanted to sleep with her and she slept with legs bent and that's when it started.   Says she has tried bracing, icing, ibuprofen. The brace hurts without it.   She says hurts all the way around the knee. Hurts in multiple bands around the knee.   There is catching in the knee. Has to go up stairs has to go slower, hard to do steps.   It is popping but not painful popping.  Both are bad up and down the stairs.     She had her hysterectomy in 2019.   She has gained about 20 lbs since her  She is eating less and eating more fresh foods.   Feels this has helped.   Timing and volume. She is not a snacker. Sometimes her meals are large.   With her knee being a problem this has reduced her activity.  She needs a longer warm up period.     She explains was seen by neurology related to some new headaches and facial tingling/numbness. She had a lot of concern because her father had a sinus derived cancer and so she was evaluated and had imaging back in 2019.     Objective   Vital Signs:   /76 (BP Location: Right arm, Patient Position: Sitting, Cuff Size: Adult)   Pulse 72   Temp 98.8 °F (37.1 °C) (Temporal)   Resp 17   Ht 160 cm (63\")   Wt 101 kg (221 lb 12.8 oz)   SpO2 98%   BMI 39.29 kg/m²     Physical Exam  Vitals reviewed.   Constitutional:       General: She is not in acute distress.  Eyes:      General: No " scleral icterus.     Conjunctiva/sclera: Conjunctivae normal.   Pulmonary:      Effort: Pulmonary effort is normal. No respiratory distress.   Neurological:      Mental Status: She is alert and oriented to person, place, and time.   Psychiatric:         Behavior: Behavior normal.        Result Review :                 Assessment and Plan    Diagnoses and all orders for this visit:    1. Chronic pain of left knee (Primary)  -     Ambulatory Referral to Physical Therapy Evaluate and treat    2. Hyperlipidemia, unspecified hyperlipidemia type    3. Obesity (BMI 35.0-39.9 without comorbidity)        Follow Up   No follow-ups on file.  Patient was given instructions and counseling regarding her condition or for health maintenance advice. Please see specific information pulled into the AVS if appropriate.     She is worried about her ongoing pain in her left knee and her weight gain. [place] and used to be active all the time and now because of pain this is much harder. She has changed her diet and this is helping and she feels better. We talked about working on the upper body, low weight high reps, getting the heart rate up, core work as she is able excluding the knee.   For the knee we talked about imaging. Given that there was not an injury or trauma related to this I recommended PT, PT and joint injection as first possible pathways for treatment. She would like to be conservative and try PT.   She is going to let me know if getting worse or having any concerns.   She would like baseline labs. 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 She can have her labs drawn downstairs with her PT visit. This is what she prefers.     Patient gave consent today for a telehealth video visit as following recommendations of our governor and CDC during the COVID-19 pandemic.  Pt in office, provider in quarantine.  21 min was spent in  discussion with pt and greater than 50% of that time was spent counseling.

## 2021-11-17 ENCOUNTER — PATIENT ROUNDING (BHMG ONLY) (OUTPATIENT)
Dept: FAMILY MEDICINE CLINIC | Facility: CLINIC | Age: 46
End: 2021-11-17

## 2021-11-17 NOTE — PROGRESS NOTES
A My-Chart message has been sent to the patient for PATIENT ROUNDING with [facility]

## 2021-12-15 ENCOUNTER — TREATMENT (OUTPATIENT)
Dept: PHYSICAL THERAPY | Facility: CLINIC | Age: 46
End: 2021-12-15

## 2021-12-15 DIAGNOSIS — G89.29 CHRONIC PAIN OF LEFT KNEE: Primary | ICD-10-CM

## 2021-12-15 DIAGNOSIS — M25.562 CHRONIC PAIN OF LEFT KNEE: Primary | ICD-10-CM

## 2021-12-15 DIAGNOSIS — R26.2 DIFFICULTY WALKING: ICD-10-CM

## 2021-12-15 PROCEDURE — 97530 THERAPEUTIC ACTIVITIES: CPT | Performed by: PHYSICAL THERAPIST

## 2021-12-15 PROCEDURE — 97162 PT EVAL MOD COMPLEX 30 MIN: CPT | Performed by: PHYSICAL THERAPIST

## 2021-12-15 PROCEDURE — 97014 ELECTRIC STIMULATION THERAPY: CPT | Performed by: PHYSICAL THERAPIST

## 2021-12-15 NOTE — PROGRESS NOTES
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 I slept with my cat between my knees for 3 nights in April and that when I noticed it. Stairs and standing while I teach bother me. Does not bother me when I sit or sleep. Massage makes it feel better. Ice helped a little bit. Left knee mostly, but the right is starting to bother me too. I had a total hysterectomy and that affected my weight I think. No numbness or tingling. I sometimes have back pain when I stand for long period of time.   I separate my legs so that I do not have to move my knees too much.   Before April no knee pain.         Patient Occupation: teacher Pain  Current pain ratin  At worst pain ratin  Location: (L) medial knee and laterla quad  Relieving factors: rest  Aggravating factors: stairs, ambulation and standing  Progression: worsening    Diagnostic Tests  No diagnostic tests performed             Objective          Observations   Left Knee   Positive for edema.       Palpation   Left   Tenderness of the distal semimembranosus, distal semitendinosus, rectus femoris and vastus lateralis.     Tenderness   Left Knee   Tenderness in the lateral joint line, lateral patella, medial patella and patellar tendon.     Neurological Testing     Sensation     Knee   Left Knee   Intact: light touch    Right Knee   Intact: light touch     Active Range of Motion   Left Knee   Flexion: 120 degrees   Extension: 9 degrees     Right Knee   Flexion: 140 degrees   Extension: 0 degrees     Strength/Myotome Testing     Lumbar     Right   Normal strength    Left Hip   Planes of Motion   Flexion:  4-  Abduction: 4-  External rotation: 4+  Internal rotation: 4+    Left Knee   Flexion: 5  Extension: 5  Quadriceps contraction: fair    Right Knee   Quadriceps contraction: good    Left Ankle/Foot   Dorsiflexion: 5    Tests     Left Knee   Negative anterior drawer, bounce home, posterior drawer, valgus stress test at 0 degrees and varus stress test at 0 degrees.     Ambulation     Observational Gait   Gait: antalgic   Decreased walking speed.   Left arm swing: decreased    Functional Assessment     Single Leg Stance   Left: 10 seconds  Right: 10 seconds          Assessment & Plan     Assessment  Impairments: abnormal gait, abnormal or restricted ROM, activity intolerance, impaired balance, impaired physical strength, lacks appropriate home exercise program, pain with function and weight-bearing intolerance  Functional Limitations: lifting, sleeping, walking, uncomfortable because of pain and standing  Assessment details: Pt presents to PT with symptoms consistent with (L) knee pain, weakness, and limited ROM limiting ability to transfer, ambulate and climb stairs comfortably.  Pt would benefit from skilled PT intervention to address the deficits noted.   KT tape application for knee stabilization.  Prognosis: good  Prognosis details: SHORT TERM GOALS:    2-3 weeks    1. Patient to be compliant with stretching and HEP  2. Pt to report 3/10 or less knee pain with transfers and stair climbing.  3. Pt will improve knee extension AROM to 5 degrees or less in order to improve gait mechanics  4.  Pt will demonstrate improved gait mechanics demonstrating equal stance time, decreased Trendelenburg and knee ext through midstance    LONG TERM GOALS:    4-6 weeks    1. Pt. to score < 15% of disability on WOMAC  2.  Able to climb 6 steps with reciprocal pattern due to improved pain and strength.  3.  Pt to improve LE endurance/strength in order to tolerate ambulating for 60 min without rest break due to improved hip and knee  strength  4. Pt will improve B hip flexion, abduction and extension to 5/5 MMT in order to improve gait and stair climbing as well as decrease fall risk    Goals  Plan Goals:       Plan  Therapy options: will be seen for skilled therapy services  Planned modality interventions: cryotherapy, electrical stimulation/Russian stimulation, iontophoresis, TENS, thermotherapy (hydrocollator packs) and ultrasound  Planned therapy interventions: ADL retraining, balance/weight-bearing training, flexibility, functional ROM exercises, gait training, home exercise program, joint mobilization, manual therapy, neuromuscular re-education, postural training, soft tissue mobilization, spinal/joint mobilization, strengthening, stretching and therapeutic activities  Frequency: 2x week  Duration in weeks: 8  Treatment plan discussed with: patient        Manual Therapy:    -     mins  60267;  Therapeutic Exercise:    5     mins  64781;     Neuromuscular Saima:    -    mins  07665;    Therapeutic Activity:     10     mins  49832;     Gait Training:      -     mins  21671;     Ultrasound:     -     mins  93145;    Electrical Stimulation:    10     mins  93167 ( );  Dry Needling     -     mins self-pay    Timed Treatment:   15   mins   Total Treatment:     55   mins      PT SIGNATURE: CAROL Song license #: [id number]  DATE TREATMENT INITIATED: 12/15/2021    Initial Certification  Certification Period: 3/15/2022  I certify that the therapy services are furnished while this patient is under my care.  The services outlined above are required by this patient, and will be reviewed every 90 days.     PHYSICIAN: Alessandra Li MD      DATE:     Please sign and return via fax to [phone].. Thank you, [facility].

## 2022-01-11 ENCOUNTER — TREATMENT (OUTPATIENT)
Dept: PHYSICAL THERAPY | Facility: CLINIC | Age: 47
End: 2022-01-11

## 2022-01-11 DIAGNOSIS — G89.29 CHRONIC PAIN OF LEFT KNEE: Primary | ICD-10-CM

## 2022-01-11 DIAGNOSIS — M25.562 CHRONIC PAIN OF LEFT KNEE: Primary | ICD-10-CM

## 2022-01-11 DIAGNOSIS — R26.2 DIFFICULTY WALKING: ICD-10-CM

## 2022-01-11 PROCEDURE — 97112 NEUROMUSCULAR REEDUCATION: CPT | Performed by: PHYSICAL THERAPIST

## 2022-01-11 PROCEDURE — 97110 THERAPEUTIC EXERCISES: CPT | Performed by: PHYSICAL THERAPIST

## 2022-01-11 NOTE — PROGRESS NOTES
Physical Therapy Daily Progress Note  Visit: 2    Mary Cole reports: I didn't do the exercises as much on vacation    Subjective     Objective   See Exercise, Manual, and Modality Logs for complete treatment.       Assessment & Plan     Assessment    Assessment details: Pt tolerated treatment well. Will do trial of STM or cupping next session and re-tape and e-stim as needed. Added reps to exercises and added bridge to HEP    Plan  Plan details: Progress per POC        Manual Therapy:    -     mins  99761;  Therapeutic Exercise:    25     mins  30169;     Neuromuscular Saima:    10    mins  42958;    Therapeutic Activity:     -     mins  58111;     Gait Training:      -     mins  29988;     Ultrasound:     -     mins  72945;    Electrical Stimulation:    -     mins  83762 ( );  Dry Needling     -     mins self-pay    Timed Treatment:   35   mins   Total Treatment:     45   mins    Tori Lindo PT  [place] License #: [id number]    Physical Therapist

## 2022-01-18 ENCOUNTER — TREATMENT (OUTPATIENT)
Dept: PHYSICAL THERAPY | Facility: CLINIC | Age: 47
End: 2022-01-18

## 2022-01-18 DIAGNOSIS — R26.2 DIFFICULTY WALKING: ICD-10-CM

## 2022-01-18 DIAGNOSIS — G89.29 CHRONIC PAIN OF LEFT KNEE: Primary | ICD-10-CM

## 2022-01-18 DIAGNOSIS — M25.562 CHRONIC PAIN OF LEFT KNEE: Primary | ICD-10-CM

## 2022-01-18 PROCEDURE — 97530 THERAPEUTIC ACTIVITIES: CPT | Performed by: PHYSICAL THERAPIST

## 2022-01-18 PROCEDURE — 97110 THERAPEUTIC EXERCISES: CPT | Performed by: PHYSICAL THERAPIST

## 2022-01-18 PROCEDURE — 97014 ELECTRIC STIMULATION THERAPY: CPT | Performed by: PHYSICAL THERAPIST

## 2022-01-18 NOTE — PROGRESS NOTES
Physical Therapy Daily Progress Note  Visit: 3    Mary Cole reports: My knee is hurting today. I went bowling last night. I also have not done any exercises this week    Subjective     Objective   See Exercise, Manual, and Modality Logs for complete treatment.       Assessment & Plan     Assessment    Assessment details: Education for importance of performing exercises consistently to help manage sx's. Had some pain with lunging today.    Plan  Plan details: Progress with strength and stability of (L) knee and hip        Manual Therapy:    4     mins  97752;  Therapeutic Exercise:    30     mins  84032;     Neuromuscular Saima:    5    mins  58556;    Therapeutic Activity:     8     mins  30310;     Gait Training:      -     mins  86909;     Ultrasound:     -     mins  83838;    Electrical Stimulation:    15     mins  88144 ( );  Dry Needling     -     mins self-pay    Timed Treatment:   47   mins   Total Treatment:     70   mins    Tori Lindo, PT  [place] License #: [id number]    Physical Therapist

## 2022-01-25 ENCOUNTER — TREATMENT (OUTPATIENT)
Dept: PHYSICAL THERAPY | Facility: CLINIC | Age: 47
End: 2022-01-25

## 2022-01-25 DIAGNOSIS — G89.29 CHRONIC PAIN OF LEFT KNEE: Primary | ICD-10-CM

## 2022-01-25 DIAGNOSIS — R26.2 DIFFICULTY WALKING: ICD-10-CM

## 2022-01-25 DIAGNOSIS — M25.562 CHRONIC PAIN OF LEFT KNEE: Primary | ICD-10-CM

## 2022-01-25 PROCEDURE — 97110 THERAPEUTIC EXERCISES: CPT | Performed by: PHYSICAL THERAPIST

## 2022-01-25 PROCEDURE — 97014 ELECTRIC STIMULATION THERAPY: CPT | Performed by: PHYSICAL THERAPIST

## 2022-01-25 PROCEDURE — 97530 THERAPEUTIC ACTIVITIES: CPT | Performed by: PHYSICAL THERAPIST

## 2022-01-25 NOTE — PROGRESS NOTES
Physical Therapy Daily Progress Note  Visit: 4    Mary Cole reports: I am doing better this week. I have been doing my exercises more    Subjective     Objective   See Exercise, Manual, and Modality Logs for complete treatment.       Assessment & Plan     Assessment    Assessment details: Pt doing better this week. Deferred lunges today due to pain last visit and will resume them next visit. Added prone quad stretch to HEP    Plan  Plan details: Progress per POC        Manual Therapy:    3     mins  47101;  Therapeutic Exercise:    20     mins  12569;     Neuromuscular Saima:    -    mins  86296;    Therapeutic Activity:     9     mins  57161;     Gait Training:      -     mins  53150;     Ultrasound:     -     mins  05114;    Electrical Stimulation:    15     mins  52669 ( );  Dry Needling     -     mins self-pay    Timed Treatment:   32   mins   Total Treatment:     50   mins    Tori Lindo PT  [place] License #: [id number]    Physical Therapist

## 2022-03-01 ENCOUNTER — TREATMENT (OUTPATIENT)
Dept: PHYSICAL THERAPY | Facility: CLINIC | Age: 47
End: 2022-03-01

## 2022-03-01 DIAGNOSIS — G89.29 CHRONIC PAIN OF LEFT KNEE: Primary | ICD-10-CM

## 2022-03-01 DIAGNOSIS — R26.2 DIFFICULTY WALKING: ICD-10-CM

## 2022-03-01 DIAGNOSIS — M25.562 CHRONIC PAIN OF LEFT KNEE: Primary | ICD-10-CM

## 2022-03-01 PROCEDURE — 97014 ELECTRIC STIMULATION THERAPY: CPT | Performed by: PHYSICAL THERAPIST

## 2022-03-01 PROCEDURE — 97112 NEUROMUSCULAR REEDUCATION: CPT | Performed by: PHYSICAL THERAPIST

## 2022-03-01 PROCEDURE — 97110 THERAPEUTIC EXERCISES: CPT | Performed by: PHYSICAL THERAPIST

## 2022-03-01 NOTE — PROGRESS NOTES
Physical Therapy Daily Progress Note  Visit: 5    Mary Kelsey reports: After our last appt I was doing good. But now it is coming back and the side of the leg is tight    Subjective     Objective   See Exercise, Manual, and Modality Logs for complete treatment.       Assessment & Plan     Assessment    Assessment details: Resumed exercises and added IT band stretch for HEP. Will continue to focus on strength and stabilization of (L) knee      Plan  Plan details: Progress per POC        Manual Therapy:    5     mins  05557;  Therapeutic Exercise:    27     mins  46877;     Neuromuscular Saima:    10    mins  26021;    Therapeutic Activity:     -     mins  26595;     Gait Training:      -     mins  18742;     Ultrasound:     -     mins  41284;    Electrical Stimulation:    15     mins  33416 ( );  Dry Needling     -     mins self-pay    Timed Treatment:   42   mins   Total Treatment:     60   mins    Tori Lindo, PT  [place] License #: [id number]    Physical Therapist

## 2022-03-03 ENCOUNTER — TELEPHONE (OUTPATIENT)
Dept: FAMILY MEDICINE CLINIC | Facility: CLINIC | Age: 47
End: 2022-03-03

## 2022-03-03 DIAGNOSIS — G89.29 CHRONIC PAIN OF LEFT KNEE: Primary | ICD-10-CM

## 2022-03-03 DIAGNOSIS — M25.562 CHRONIC PAIN OF LEFT KNEE: Primary | ICD-10-CM

## 2022-03-03 DIAGNOSIS — N20.0 NEPHROLITHIASIS: ICD-10-CM

## 2022-03-03 NOTE — TELEPHONE ENCOUNTER
Caller: Kelsey Mary    Relationship: Self    Best call back number: [phone]    What is the best time to reach you: ANY TIME    Who are you requesting to speak with (clinical staff, provider,  specific staff member): DR. MASTERSON, MEDICAL STAFF    What was the call regarding: PATIENT CALLED TO LET DR. MASTERSON KNOW THAT SHE WAS NOT ABLE TO GET IN TO SEE PHYSICAL THERAPY OVER THE LAST COUPLE MONTHS. SHE DID GO LAST WEEK AND STATES SHE FEELS MUCH BETTER AND IS GOING TO START GOING REGULARLY. SHE WOULD LIKE DR. MASTERSON TO SEND AN ORDER TO EXTEND HER PHYSICAL THERAPY SO SHE CAN CONTINUE TO GO. SHE GOES TO THE [facility] LOCATION ON [address]      PATIENT ALSO STATES THAT SHE BELIEVES SHE IS PASSING A KIDNEY STONE, SHE HAD BAD BACK PAIN AND VOMITING YESTERDAY AND WANTS TO KNOW IF DR. MASTERSON CAN REFER HER TO [facility] ON [facility] SO SHE CAN BE EVALUATED.    PLEASE ADVISE    Do you require a callback: YES

## 2022-03-03 NOTE — TELEPHONE ENCOUNTER
Please call pt. Let her know I sent referrals. Please set her up for a physical with me a few months out. We missed each other in February.

## 2022-03-07 ENCOUNTER — TELEPHONE (OUTPATIENT)
Dept: FAMILY MEDICINE CLINIC | Facility: CLINIC | Age: 47
End: 2022-03-07

## 2022-03-07 NOTE — TELEPHONE ENCOUNTER
Pt calls office and asks to speak with this nurse. Asking for abx to be given by Jen r/t her leaving town on Wed-Sun. She complains of back pain , cloudy urine, and fever. [facility] but has not heard anything back yet and is fearful that symptoms will worsen while out of town stated per patient.

## 2022-03-08 ENCOUNTER — TELEPHONE (OUTPATIENT)
Dept: FAMILY MEDICINE CLINIC | Facility: CLINIC | Age: 47
End: 2022-03-08

## 2022-03-08 ENCOUNTER — CLINICAL SUPPORT (OUTPATIENT)
Dept: FAMILY MEDICINE CLINIC | Facility: CLINIC | Age: 47
End: 2022-03-08

## 2022-03-08 DIAGNOSIS — R50.9 FEVER, UNSPECIFIED FEVER CAUSE: ICD-10-CM

## 2022-03-08 DIAGNOSIS — R30.0 BURNING WITH URINATION: Primary | ICD-10-CM

## 2022-03-08 LAB
BILIRUB BLD-MCNC: NEGATIVE MG/DL
CLARITY, POC: CLEAR
COLOR UR: YELLOW
EXPIRATION DATE: NORMAL
GLUCOSE UR STRIP-MCNC: NEGATIVE MG/DL
KETONES UR QL: NEGATIVE
LEUKOCYTE EST, POC: NEGATIVE
Lab: NORMAL
PH UR: 6 [PH] (ref 5–8)
PROT UR STRIP-MCNC: NEGATIVE MG/DL
RBC # UR STRIP: NEGATIVE /UL
SP GR UR: 1.03 (ref 1–1.03)
UROBILINOGEN UR QL: NORMAL

## 2022-03-08 PROCEDURE — 81003 URINALYSIS AUTO W/O SCOPE: CPT | Performed by: FAMILY MEDICINE

## 2022-03-08 RX ORDER — ONDANSETRON 4 MG/1
4 TABLET, ORALLY DISINTEGRATING ORAL EVERY 8 HOURS PRN
Qty: 20 TABLET | Refills: 0 | Status: SHIPPED | OUTPATIENT
Start: 2022-03-08 | End: 2022-08-01

## 2022-03-08 RX ORDER — NITROFURANTOIN 25; 75 MG/1; MG/1
100 CAPSULE ORAL 2 TIMES DAILY
Qty: 10 CAPSULE | Refills: 0 | Status: SHIPPED | OUTPATIENT
Start: 2022-03-08 | End: 2022-08-01

## 2022-03-08 NOTE — TELEPHONE ENCOUNTER
I called patient to discuss her symptoms.  She said this is actually been going on for several weeks with off-and-on symptoms.  She has history of kidney stones and thought she could be passing another kidney stone or have a urinary tract infection.  She says several weeks ago when he started she felt nauseated and had back pain burning with urination blood in her urine and a temperature of 101.  She said this resolved but then about 7 days ago recurred.  She had a fever to 101 last week and all the symptoms of pain with nausea and burning with urination as well as urinary frequency.  She was having left flank pain but currently is more bilaterally paraspinal and just at the base of the rib cage.  She has not had a fever since a week ago but she still feels a little nauseated today.  No more blood in the urine.  She did hear back from [facility] today and has a number to call for scheduling.    I spoke with patient about her urinalysis.  I told her that there are no concerning findings that we associate with infection in the urine.  There is no blood and no signs of bacteria or white blood cells.  Her specific gravity is significantly elevated which means she is certainly dehydrated which can cause burning with urination but not the other symptoms.  I told her this could more likely be related to kidney stones even though there is no blood on the urinalysis today.  Her back pain is a little atypical now that it is migrated.  She has been afebrile for a week.  She has some mild nausea.  I have not made a diagnosis of a urinary tract infection.  I do not know exactly what is causing her symptoms.  She has a history of kidney stones so she is at risk for having kidney stones again.  With all of her symptoms that she has had been having going on for the past several weeks they all could be explained by kidney stones.  If she has kidney stones she does have a nidus for infection.  I told her I will write for an  antibiotic because she is at risk for infection and has this potential and she is going to be traveling out of town.  I told her I do not know if this is going to prevent a problem or treat her current symptoms.  We will give her something for nausea so she can be more comfortable as well.  I told her she absolutely needs to drink more water because she is dehydrated and if she has kidney stones this will help to.  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  She is not familiar with this area in the medical care facilities there.  She said she try to familiarize herself.  I told her if she develops blood in her urine again, fever, increased pain or change in pain pattern, or any other significant symptoms that she should seek medical care no matter where she is.  I have not made a diagnosis and I do not know exactly what is causing her symptoms.  I recommend further evaluation but she is unable to do that at this time.  I encouraged her to call the [facility] number so that she does have appointments set up for when she returns and hopefully I will be well and we can continue her evaluation upon return to [place].  Patient voiced understanding of what I was telling her.  She is agreeable with this plan.

## 2022-03-10 LAB
BACTERIA UR CULT: NORMAL
BACTERIA UR CULT: NORMAL

## 2022-03-17 ENCOUNTER — TREATMENT (OUTPATIENT)
Dept: PHYSICAL THERAPY | Facility: CLINIC | Age: 47
End: 2022-03-17

## 2022-03-17 DIAGNOSIS — R26.2 DIFFICULTY WALKING: ICD-10-CM

## 2022-03-17 DIAGNOSIS — M25.562 CHRONIC PAIN OF LEFT KNEE: Primary | ICD-10-CM

## 2022-03-17 DIAGNOSIS — G89.29 CHRONIC PAIN OF LEFT KNEE: Primary | ICD-10-CM

## 2022-03-17 PROCEDURE — 97110 THERAPEUTIC EXERCISES: CPT | Performed by: PHYSICAL THERAPIST

## 2022-03-17 PROCEDURE — 97112 NEUROMUSCULAR REEDUCATION: CPT | Performed by: PHYSICAL THERAPIST

## 2022-03-17 PROCEDURE — 97014 ELECTRIC STIMULATION THERAPY: CPT | Performed by: PHYSICAL THERAPIST

## 2022-03-17 NOTE — PROGRESS NOTES
Physical Therapy Daily Progress Note  Visit: 6    Mary Cole reports: What we did last time helped a whole lot    Subjective     Objective   See Exercise, Manual, and Modality Logs for complete treatment.       Assessment & Plan     Assessment    Assessment details: Pt doing excellent. Would benefit from continued therapy to address (L) knee pain    Plan  Plan details: Reassess formally next session        Manual Therapy:    5     mins  52118;  Therapeutic Exercise:    30     mins  92086;     Neuromuscular Saima:    10    mins  30333;    Therapeutic Activity:     -     mins  46019;     Gait Training:      -     mins  51994;     Ultrasound:     -     mins  28799;    Electrical Stimulation:    15     mins  17973 ( );  Dry Needling     -     mins self-pay    Timed Treatment:   45   mins   Total Treatment:     62   mins    Tori Lindo PT  [place] License #: [id number]    Physical Therapist

## 2022-03-31 ENCOUNTER — TREATMENT (OUTPATIENT)
Dept: PHYSICAL THERAPY | Facility: CLINIC | Age: 47
End: 2022-03-31

## 2022-03-31 DIAGNOSIS — M25.562 CHRONIC PAIN OF LEFT KNEE: Primary | ICD-10-CM

## 2022-03-31 DIAGNOSIS — R26.2 DIFFICULTY WALKING: ICD-10-CM

## 2022-03-31 DIAGNOSIS — G89.29 CHRONIC PAIN OF LEFT KNEE: Primary | ICD-10-CM

## 2022-03-31 PROCEDURE — 97112 NEUROMUSCULAR REEDUCATION: CPT | Performed by: PHYSICAL THERAPIST

## 2022-03-31 PROCEDURE — 97110 THERAPEUTIC EXERCISES: CPT | Performed by: PHYSICAL THERAPIST

## 2022-03-31 PROCEDURE — 97014 ELECTRIC STIMULATION THERAPY: CPT | Performed by: PHYSICAL THERAPIST

## 2022-03-31 NOTE — PROGRESS NOTES
Physical Therapy Daily Progress Note  Visit: 7    Mary Cole reports: The knee is feeling better. I still have trouble going up the steps and lean on the rails. Pain is 0 now, but can get to 8/10 on steps.     Subjective     Objective   See Exercise, Manual, and Modality Logs for complete treatment.       Assessment & Plan     Assessment    Assessment details: Pt is making progress with PT, but continues to have weakness in (L) knee and difficulty with walking and stair climbing especially. Pt would benefit from continued therapy to address knee strength and stabilization, but we also discussed benefit of going to see ortho MD for possible imaging or cortisone injection to manage sx's.     Plan  Plan details: Continue 2x/wk for 3 weeks to address stabilization         Manual Therapy:    4     mins  79392;  Therapeutic Exercise:    30     mins  41283;     Neuromuscular Saima:    10    mins  74704;    Therapeutic Activity:     -     mins  74568;     Gait Training:      -     mins  37691;     Ultrasound:     -     mins  59504;    Electrical Stimulation:    15     mins  86588 ( );  Dry Needling     -     mins self-pay    Timed Treatment:   44   mins   Total Treatment:     65   mins    Tori Lindo PT  [place] License #: [id number]    Physical Therapist

## 2022-08-01 ENCOUNTER — OFFICE VISIT (OUTPATIENT)
Dept: FAMILY MEDICINE CLINIC | Facility: CLINIC | Age: 47
End: 2022-08-01

## 2022-08-01 VITALS
HEART RATE: 73 BPM | BODY MASS INDEX: 37.74 KG/M2 | OXYGEN SATURATION: 98 % | DIASTOLIC BLOOD PRESSURE: 78 MMHG | RESPIRATION RATE: 16 BRPM | WEIGHT: 213 LBS | SYSTOLIC BLOOD PRESSURE: 110 MMHG | HEIGHT: 63 IN | TEMPERATURE: 98 F

## 2022-08-01 DIAGNOSIS — Z86.2 HISTORY OF ANEMIA: ICD-10-CM

## 2022-08-01 DIAGNOSIS — Z00.00 ANNUAL PHYSICAL EXAM: Primary | ICD-10-CM

## 2022-08-01 DIAGNOSIS — Z13.220 SCREENING FOR HYPERLIPIDEMIA: ICD-10-CM

## 2022-08-01 DIAGNOSIS — Z12.11 COLON CANCER SCREENING: ICD-10-CM

## 2022-08-01 DIAGNOSIS — Z11.59 ENCOUNTER FOR HEPATITIS C SCREENING TEST FOR LOW RISK PATIENT: ICD-10-CM

## 2022-08-01 PROCEDURE — 99396 PREV VISIT EST AGE 40-64: CPT | Performed by: FAMILY MEDICINE

## 2022-08-01 NOTE — PROGRESS NOTES
"Chief Complaint  Annual Exam    Subjective        Mary Cole presents to [facility]  History of Present Illness  Annual exam  Getting more active  Hysterectomy for cysts on the ovaries. Follows with gyn. Seen there in October. Had her mammogram then too. mammogram was normal.   Says she can't lose weight now.   Prefers to go to provider at [facility] for her colonoscopy.   She doesn't snack.   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   She has cut from three meals to two meals per day.   Last night she had a peach for dinner.   Not exercising but stays active.   She eats a lot of fruits and vegetables.   Not eating a lot of meat.   Normally a banana for breakfast.   Today she had cereal.   Dinner fish or chicken with green beans or corn.   Two veggies for meal.     Objective   Vital Signs:  /78 (BP Location: Right arm, Patient Position: Sitting, Cuff Size: Adult)   Pulse 73   Temp 98 °F (36.7 °C) (Infrared)   Resp 16   Ht 160 cm (63\")   Wt 96.6 kg (213 lb)   SpO2 98%   BMI 37.73 kg/m²   Estimated body mass index is 37.73 kg/m² as calculated from the following:    Height as of this encounter: 160 cm (63\").    Weight as of this encounter: 96.6 kg (213 lb).          Physical Exam  Vitals reviewed.   Constitutional:       General: She is not in acute distress.     Appearance: Normal appearance. She is not ill-appearing or toxic-appearing.   HENT:      Head: Normocephalic and atraumatic.      Right Ear: Tympanic membrane, ear canal and external ear normal. There is no impacted cerumen.      Left Ear: Tympanic membrane, ear canal and external ear normal. There is no impacted cerumen.      Nose: Nose normal.      Mouth/Throat:      Mouth: Mucous membranes are moist.      Pharynx: Oropharynx is clear. No oropharyngeal exudate or posterior oropharyngeal erythema.   Eyes:      General: No scleral icterus.        Right eye: No discharge.         Left eye: No " discharge.      Conjunctiva/sclera: Conjunctivae normal.   Neck:      Thyroid: No thyromegaly.      Vascular: No carotid bruit.   Cardiovascular:      Rate and Rhythm: Normal rate and regular rhythm.      Heart sounds: Normal heart sounds. No murmur heard.    No friction rub. No gallop.   Pulmonary:      Effort: Pulmonary effort is normal. No respiratory distress.      Breath sounds: Normal breath sounds. No wheezing or rales.   Chest:      Chest wall: No tenderness.   Abdominal:      General: Bowel sounds are normal. There is no distension.      Palpations: Abdomen is soft. There is no mass.      Tenderness: There is no abdominal tenderness. There is no guarding.   Musculoskeletal:         General: No deformity.      Cervical back: Neck supple.      Right lower leg: No edema.      Left lower leg: No edema.   Lymphadenopathy:      Cervical: No cervical adenopathy.   Skin:     Coloration: Skin is not jaundiced or pale.   Neurological:      General: No focal deficit present.      Mental Status: She is alert and oriented to person, place, and time.      Motor: No abnormal muscle tone.      Coordination: Coordination normal.   Psychiatric:         Mood and Affect: Mood normal.         Behavior: Behavior normal.        Result Review :                Assessment and Plan   Diagnoses and all orders for this visit:    1. Annual physical exam (Primary)    2. Colon cancer screening  -     Ambulatory Referral For Screening Colonoscopy    3. History of anemia  -     CBC & Differential  -     Comprehensive Metabolic Panel    4. Screening for hyperlipidemia  -     Lipid Panel    5. Encounter for hepatitis C screening test for low risk patient  -     Hepatitis C Antibody             Follow Up   No follow-ups on file.  Patient was given instructions and counseling regarding her condition or for health maintenance advice. Please see specific information pulled into the AVS if appropriate.     Preventive counseling  We discussed the  importance of regular physical activity.  Cardiovascular activity for the equivalent of 30 minutes 5 days per week.  We also discussed the importance of healthy diet to avoid weight gain and sugar intolerance.  I recommend predominantly filling the diet with vegetables and lean meats followed by fruits and whole grains.  I also recommend overall avoiding processed foods.  We talked a lot about her diet. She is very limited. I don't think she eats enough categories, as a result I think her metabolism is very slow and she needs to eat more, more often.   She does not need an appetite suppressant.  Would be helpful for her to get more active as well. With knee pain she does not really do much.   Her pain seems IT band related.   She is going to work on stretching more significantly and see if she can get more active.   Her pain is really lateral thigh to lateral knee. PT did help.  May also consider seeing sports for more evaluation and potential for local therapy.   Using TENS and this helps.   Discussed vaccines.   Going to revisit Tdap next year.   She had COVID infection.   Discussed colonoscopy. She is aware this is due. Wants to see specific provider and referral was placed.

## 2022-08-02 LAB
ALBUMIN SERPL-MCNC: 4.8 G/DL (ref 3.8–4.8)
ALBUMIN/GLOB SERPL: 1.8 {RATIO} (ref 1.2–2.2)
ALP SERPL-CCNC: 114 IU/L (ref 44–121)
ALT SERPL-CCNC: 33 IU/L (ref 0–32)
AST SERPL-CCNC: 25 IU/L (ref 0–40)
BASOPHILS # BLD AUTO: 0.1 X10E3/UL (ref 0–0.2)
BASOPHILS NFR BLD AUTO: 1 %
BILIRUB SERPL-MCNC: 0.4 MG/DL (ref 0–1.2)
BUN SERPL-MCNC: 10 MG/DL (ref 6–24)
BUN/CREAT SERPL: 11 (ref 9–23)
CALCIUM SERPL-MCNC: 9.9 MG/DL (ref 8.7–10.2)
CHLORIDE SERPL-SCNC: 103 MMOL/L (ref 96–106)
CHOLEST SERPL-MCNC: 204 MG/DL (ref 100–199)
CO2 SERPL-SCNC: 24 MMOL/L (ref 20–29)
CREAT SERPL-MCNC: 0.89 MG/DL (ref 0.57–1)
EGFRCR SERPLBLD CKD-EPI 2021: 80 ML/MIN/1.73
EOSINOPHIL # BLD AUTO: 0.2 X10E3/UL (ref 0–0.4)
EOSINOPHIL NFR BLD AUTO: 2 %
ERYTHROCYTE [DISTWIDTH] IN BLOOD BY AUTOMATED COUNT: 12.4 % (ref 11.7–15.4)
GLOBULIN SER CALC-MCNC: 2.6 G/DL (ref 1.5–4.5)
GLUCOSE SERPL-MCNC: 93 MG/DL (ref 65–99)
HCT VFR BLD AUTO: 41.1 % (ref 34–46.6)
HCV AB S/CO SERPL IA: <0.1 S/CO RATIO (ref 0–0.9)
HDLC SERPL-MCNC: 43 MG/DL
HGB BLD-MCNC: 14 G/DL (ref 11.1–15.9)
IMM GRANULOCYTES # BLD AUTO: 0 X10E3/UL (ref 0–0.1)
IMM GRANULOCYTES NFR BLD AUTO: 0 %
LDLC SERPL CALC-MCNC: 95 MG/DL (ref 0–99)
LYMPHOCYTES # BLD AUTO: 2.6 X10E3/UL (ref 0.7–3.1)
LYMPHOCYTES NFR BLD AUTO: 32 %
MCH RBC QN AUTO: 29.7 PG (ref 26.6–33)
MCHC RBC AUTO-ENTMCNC: 34.1 G/DL (ref 31.5–35.7)
MCV RBC AUTO: 87 FL (ref 79–97)
MONOCYTES # BLD AUTO: 0.6 X10E3/UL (ref 0.1–0.9)
MONOCYTES NFR BLD AUTO: 7 %
NEUTROPHILS # BLD AUTO: 4.7 X10E3/UL (ref 1.4–7)
NEUTROPHILS NFR BLD AUTO: 58 %
PLATELET # BLD AUTO: 287 X10E3/UL (ref 150–450)
POTASSIUM SERPL-SCNC: 4.5 MMOL/L (ref 3.5–5.2)
PROT SERPL-MCNC: 7.4 G/DL (ref 6–8.5)
RBC # BLD AUTO: 4.71 X10E6/UL (ref 3.77–5.28)
SODIUM SERPL-SCNC: 143 MMOL/L (ref 134–144)
TRIGL SERPL-MCNC: 394 MG/DL (ref 0–149)
VLDLC SERPL CALC-MCNC: 66 MG/DL (ref 5–40)
WBC # BLD AUTO: 8.1 X10E3/UL (ref 3.4–10.8)